# Patient Record
Sex: FEMALE | Employment: UNEMPLOYED | URBAN - METROPOLITAN AREA
[De-identification: names, ages, dates, MRNs, and addresses within clinical notes are randomized per-mention and may not be internally consistent; named-entity substitution may affect disease eponyms.]

---

## 2022-07-25 ENCOUNTER — OFFICE VISIT (OUTPATIENT)
Dept: INTERNAL MEDICINE CLINIC | Facility: CLINIC | Age: 57
End: 2022-07-25
Payer: COMMERCIAL

## 2022-07-25 VITALS
OXYGEN SATURATION: 99 % | BODY MASS INDEX: 33.11 KG/M2 | RESPIRATION RATE: 16 BRPM | DIASTOLIC BLOOD PRESSURE: 70 MMHG | HEART RATE: 91 BPM | HEIGHT: 60 IN | SYSTOLIC BLOOD PRESSURE: 150 MMHG | TEMPERATURE: 99 F | WEIGHT: 168.63 LBS

## 2022-07-25 DIAGNOSIS — E78.5 HYPERLIPIDEMIA, UNSPECIFIED HYPERLIPIDEMIA TYPE: ICD-10-CM

## 2022-07-25 DIAGNOSIS — I10 HYPERTENSION, UNSPECIFIED TYPE: ICD-10-CM

## 2022-07-25 DIAGNOSIS — L30.9 DERMATITIS: ICD-10-CM

## 2022-07-25 DIAGNOSIS — Z12.31 ENCOUNTER FOR SCREENING MAMMOGRAM FOR MALIGNANT NEOPLASM OF BREAST: ICD-10-CM

## 2022-07-25 DIAGNOSIS — Z12.11 COLON CANCER SCREENING: ICD-10-CM

## 2022-07-25 DIAGNOSIS — E11.65 TYPE 2 DIABETES MELLITUS WITH HYPERGLYCEMIA, UNSPECIFIED WHETHER LONG TERM INSULIN USE (HCC): Primary | ICD-10-CM

## 2022-07-25 LAB
CARTRIDGE LOT#: 855 NUMERIC
HEMOGLOBIN A1C: 13.1 % (ref 4.3–5.6)

## 2022-07-25 RX ORDER — LISINOPRIL 20 MG/1
20 TABLET ORAL DAILY
COMMUNITY
End: 2022-07-25

## 2022-07-25 RX ORDER — ATORVASTATIN CALCIUM 40 MG/1
40 TABLET, FILM COATED ORAL NIGHTLY
Qty: 90 TABLET | Refills: 0 | Status: SHIPPED | OUTPATIENT
Start: 2022-07-25

## 2022-07-25 RX ORDER — LISINOPRIL 20 MG/1
20 TABLET ORAL DAILY
Qty: 90 TABLET | Refills: 0 | Status: SHIPPED | OUTPATIENT
Start: 2022-07-25

## 2022-07-25 RX ORDER — AMLODIPINE BESYLATE 5 MG/1
5 TABLET ORAL DAILY
Qty: 90 TABLET | Refills: 0 | Status: SHIPPED | OUTPATIENT
Start: 2022-07-25

## 2022-07-25 RX ORDER — ATORVASTATIN CALCIUM 40 MG/1
40 TABLET, FILM COATED ORAL NIGHTLY
COMMUNITY
End: 2022-07-25

## 2022-07-25 RX ORDER — INSULIN GLARGINE 100 [IU]/ML
100 INJECTION, SOLUTION SUBCUTANEOUS NIGHTLY
COMMUNITY
End: 2022-07-25

## 2022-07-25 RX ORDER — INSULIN GLARGINE 100 [IU]/ML
40 INJECTION, SOLUTION SUBCUTANEOUS NIGHTLY
Qty: 12 EACH | Refills: 0 | Status: SHIPPED | OUTPATIENT
Start: 2022-07-25 | End: 2022-07-25

## 2022-07-25 RX ORDER — INSULIN GLARGINE 100 [IU]/ML
40 INJECTION, SOLUTION SUBCUTANEOUS NIGHTLY
Qty: 12 EACH | Refills: 0 | Status: SHIPPED | OUTPATIENT
Start: 2022-07-25

## 2022-07-25 RX ORDER — TRIAMCINOLONE ACETONIDE 1 MG/G
CREAM TOPICAL 2 TIMES DAILY PRN
Qty: 60 G | Refills: 0 | Status: SHIPPED | OUTPATIENT
Start: 2022-07-25

## 2022-07-25 RX ORDER — AMLODIPINE BESYLATE 5 MG/1
5 TABLET ORAL DAILY
COMMUNITY
End: 2022-07-25

## 2022-07-26 ENCOUNTER — LAB ENCOUNTER (OUTPATIENT)
Dept: LAB | Age: 57
End: 2022-07-26
Attending: INTERNAL MEDICINE
Payer: COMMERCIAL

## 2022-07-26 DIAGNOSIS — E11.65 TYPE 2 DIABETES MELLITUS WITH HYPERGLYCEMIA, UNSPECIFIED WHETHER LONG TERM INSULIN USE (HCC): ICD-10-CM

## 2022-07-26 DIAGNOSIS — I10 HYPERTENSION, UNSPECIFIED TYPE: ICD-10-CM

## 2022-07-26 DIAGNOSIS — E78.5 HYPERLIPIDEMIA, UNSPECIFIED HYPERLIPIDEMIA TYPE: ICD-10-CM

## 2022-07-26 DIAGNOSIS — E11.65 TYPE 2 DIABETES MELLITUS WITH HYPERGLYCEMIA, UNSPECIFIED WHETHER LONG TERM INSULIN USE (HCC): Primary | ICD-10-CM

## 2022-07-26 DIAGNOSIS — L30.9 DERMATITIS: ICD-10-CM

## 2022-07-26 LAB
ALBUMIN SERPL-MCNC: 3.5 G/DL (ref 3.4–5)
ALBUMIN/GLOB SERPL: 0.8 {RATIO} (ref 1–2)
ALP LIVER SERPL-CCNC: 97 U/L
ALT SERPL-CCNC: 24 U/L
ANION GAP SERPL CALC-SCNC: 5 MMOL/L (ref 0–18)
AST SERPL-CCNC: 12 U/L (ref 15–37)
BASOPHILS # BLD AUTO: 0.04 X10(3) UL (ref 0–0.2)
BASOPHILS NFR BLD AUTO: 0.5 %
BILIRUB SERPL-MCNC: 0.3 MG/DL (ref 0.1–2)
BUN BLD-MCNC: 11 MG/DL (ref 7–18)
CALCIUM BLD-MCNC: 9 MG/DL (ref 8.5–10.1)
CHLORIDE SERPL-SCNC: 103 MMOL/L (ref 98–112)
CHOLEST SERPL-MCNC: 214 MG/DL (ref ?–200)
CO2 SERPL-SCNC: 29 MMOL/L (ref 21–32)
CREAT BLD-MCNC: 0.67 MG/DL
EOSINOPHIL # BLD AUTO: 0.23 X10(3) UL (ref 0–0.7)
EOSINOPHIL NFR BLD AUTO: 3 %
ERYTHROCYTE [DISTWIDTH] IN BLOOD BY AUTOMATED COUNT: 13.3 %
FASTING PATIENT LIPID ANSWER: YES
FASTING STATUS PATIENT QL REPORTED: YES
GLOBULIN PLAS-MCNC: 4.3 G/DL (ref 2.8–4.4)
GLUCOSE BLD-MCNC: 200 MG/DL (ref 70–99)
HCT VFR BLD AUTO: 38 %
HDLC SERPL-MCNC: 42 MG/DL (ref 40–59)
HGB BLD-MCNC: 12.3 G/DL
IMM GRANULOCYTES # BLD AUTO: 0.01 X10(3) UL (ref 0–1)
IMM GRANULOCYTES NFR BLD: 0.1 %
LDLC SERPL CALC-MCNC: 124 MG/DL (ref ?–100)
LYMPHOCYTES # BLD AUTO: 4.9 X10(3) UL (ref 1–4)
LYMPHOCYTES NFR BLD AUTO: 63.3 %
MCH RBC QN AUTO: 27.6 PG (ref 26–34)
MCHC RBC AUTO-ENTMCNC: 32.4 G/DL (ref 31–37)
MCV RBC AUTO: 85.2 FL
MONOCYTES # BLD AUTO: 0.49 X10(3) UL (ref 0.1–1)
MONOCYTES NFR BLD AUTO: 6.3 %
NEUTROPHILS # BLD AUTO: 2.07 X10 (3) UL (ref 1.5–7.7)
NEUTROPHILS # BLD AUTO: 2.07 X10(3) UL (ref 1.5–7.7)
NEUTROPHILS NFR BLD AUTO: 26.8 %
NONHDLC SERPL-MCNC: 172 MG/DL (ref ?–130)
OSMOLALITY SERPL CALC.SUM OF ELEC: 289 MOSM/KG (ref 275–295)
PLATELET # BLD AUTO: 295 10(3)UL (ref 150–450)
POTASSIUM SERPL-SCNC: 4 MMOL/L (ref 3.5–5.1)
PROT SERPL-MCNC: 7.8 G/DL (ref 6.4–8.2)
RBC # BLD AUTO: 4.46 X10(6)UL
SODIUM SERPL-SCNC: 137 MMOL/L (ref 136–145)
TRIGL SERPL-MCNC: 275 MG/DL (ref 30–149)
TSI SER-ACNC: 3.12 MIU/ML (ref 0.36–3.74)
VIT D+METAB SERPL-MCNC: 23.3 NG/ML (ref 30–100)
VLDLC SERPL CALC-MCNC: 50 MG/DL (ref 0–30)
WBC # BLD AUTO: 7.7 X10(3) UL (ref 4–11)

## 2022-07-26 PROCEDURE — 82306 VITAMIN D 25 HYDROXY: CPT | Performed by: INTERNAL MEDICINE

## 2022-07-26 PROCEDURE — 80050 GENERAL HEALTH PANEL: CPT | Performed by: INTERNAL MEDICINE

## 2022-07-26 PROCEDURE — 80061 LIPID PANEL: CPT | Performed by: INTERNAL MEDICINE

## 2022-07-26 RX ORDER — INSULIN GLARGINE 100 [IU]/ML
40 INJECTION, SOLUTION SUBCUTANEOUS NIGHTLY
Qty: 12 EACH | Refills: 0 | Status: SHIPPED | OUTPATIENT
Start: 2022-07-26

## 2022-07-26 NOTE — TELEPHONE ENCOUNTER
Received fax from [address] requesting #180 tablets for Metformin for a 90-day supply (directions are bid). Also, Lantus is not covered under patient's insurance. \"Please verify and send new rx for Basaglar. \"

## 2022-07-28 ENCOUNTER — TELEPHONE (OUTPATIENT)
Dept: INTERNAL MEDICINE CLINIC | Facility: CLINIC | Age: 57
End: 2022-07-28

## 2022-07-28 NOTE — TELEPHONE ENCOUNTER
Outgoing fax of medical records to [facility]     Received confirmation report made a copy of the medical authorization form. Original was sent to scan and copy was placed in DV accordion.

## 2022-08-02 DIAGNOSIS — E11.65 TYPE 2 DIABETES MELLITUS WITH HYPERGLYCEMIA, UNSPECIFIED WHETHER LONG TERM INSULIN USE (HCC): ICD-10-CM

## 2022-08-02 NOTE — TELEPHONE ENCOUNTER
Incoming Refill for Insulin Glargine 100     Pending Medication routed to [facility] Medical Assistant basket.

## 2022-08-03 RX ORDER — INSULIN GLARGINE 100 [IU]/ML
40 INJECTION, SOLUTION SUBCUTANEOUS NIGHTLY
Qty: 12 ML | Refills: 2 | Status: SHIPPED | OUTPATIENT
Start: 2022-08-03

## 2022-08-03 NOTE — TELEPHONE ENCOUNTER
LOV: 7/25/2022 with Dr. Micheline Brink  RTC: 4 weeks  Last Relevant Labs: July 2022  Filled: 7/26/2022    #12 each with 0 refills    Future Appointments   Date Time Provider [facility] Jocelyn   8/22/2022  3:00 PM Hellen Hilliard MD [facility] [place]

## 2022-08-15 ENCOUNTER — TELEPHONE (OUTPATIENT)
Dept: INTERNAL MEDICINE CLINIC | Facility: CLINIC | Age: 57
End: 2022-08-15

## 2022-08-22 ENCOUNTER — OFFICE VISIT (OUTPATIENT)
Dept: INTERNAL MEDICINE CLINIC | Facility: CLINIC | Age: 57
End: 2022-08-22
Payer: COMMERCIAL

## 2022-08-22 VITALS
RESPIRATION RATE: 16 BRPM | WEIGHT: 166.19 LBS | BODY MASS INDEX: 32.63 KG/M2 | TEMPERATURE: 98 F | DIASTOLIC BLOOD PRESSURE: 80 MMHG | OXYGEN SATURATION: 99 % | HEIGHT: 60 IN | HEART RATE: 80 BPM | SYSTOLIC BLOOD PRESSURE: 144 MMHG

## 2022-08-22 DIAGNOSIS — E11.65 TYPE 2 DIABETES MELLITUS WITH HYPERGLYCEMIA, UNSPECIFIED WHETHER LONG TERM INSULIN USE (HCC): ICD-10-CM

## 2022-08-22 DIAGNOSIS — Z00.00 ANNUAL PHYSICAL EXAM: Primary | ICD-10-CM

## 2022-08-22 DIAGNOSIS — Z12.4 CERVICAL CANCER SCREENING: ICD-10-CM

## 2022-08-22 DIAGNOSIS — I10 HYPERTENSION, UNSPECIFIED TYPE: ICD-10-CM

## 2022-08-22 DIAGNOSIS — E78.5 HYPERLIPIDEMIA, UNSPECIFIED HYPERLIPIDEMIA TYPE: ICD-10-CM

## 2022-08-22 DIAGNOSIS — Z23 IMMUNIZATION DUE: ICD-10-CM

## 2022-08-22 PROCEDURE — 3079F DIAST BP 80-89 MM HG: CPT | Performed by: INTERNAL MEDICINE

## 2022-08-22 PROCEDURE — 3008F BODY MASS INDEX DOCD: CPT | Performed by: INTERNAL MEDICINE

## 2022-08-22 PROCEDURE — 90750 HZV VACC RECOMBINANT IM: CPT | Performed by: INTERNAL MEDICINE

## 2022-08-22 PROCEDURE — 90677 PCV20 VACCINE IM: CPT | Performed by: INTERNAL MEDICINE

## 2022-08-22 PROCEDURE — 99214 OFFICE O/P EST MOD 30 MIN: CPT | Performed by: INTERNAL MEDICINE

## 2022-08-22 PROCEDURE — 90472 IMMUNIZATION ADMIN EACH ADD: CPT | Performed by: INTERNAL MEDICINE

## 2022-08-22 PROCEDURE — 90471 IMMUNIZATION ADMIN: CPT | Performed by: INTERNAL MEDICINE

## 2022-08-22 PROCEDURE — 99396 PREV VISIT EST AGE 40-64: CPT | Performed by: INTERNAL MEDICINE

## 2022-08-22 PROCEDURE — 3077F SYST BP >= 140 MM HG: CPT | Performed by: INTERNAL MEDICINE

## 2022-08-22 RX ORDER — INSULIN ASPART 100 [IU]/ML
15 INJECTION, SUSPENSION SUBCUTANEOUS 2 TIMES DAILY WITH MEALS
Qty: 9 EACH | Refills: 0 | Status: SHIPPED | OUTPATIENT
Start: 2022-08-22

## 2022-08-22 RX ORDER — INSULIN ASPART 100 [IU]/ML
15 INJECTION, SUSPENSION SUBCUTANEOUS 2 TIMES DAILY
Qty: 9 EACH | Refills: 0 | Status: SHIPPED | OUTPATIENT
Start: 2022-08-22 | End: 2022-08-22

## 2022-08-22 RX ORDER — LISINOPRIL 40 MG/1
40 TABLET ORAL DAILY
Qty: 90 TABLET | Refills: 1 | Status: SHIPPED | OUTPATIENT
Start: 2022-08-22

## 2022-09-06 LAB — HPV I/H RISK 1 DNA SPEC QL NAA+PROBE: NEGATIVE

## 2022-09-07 DIAGNOSIS — B96.89 BV (BACTERIAL VAGINOSIS): Primary | ICD-10-CM

## 2022-09-07 DIAGNOSIS — N76.0 BV (BACTERIAL VAGINOSIS): Primary | ICD-10-CM

## 2022-09-07 RX ORDER — METRONIDAZOLE 500 MG/1
500 TABLET ORAL 2 TIMES DAILY
Qty: 14 TABLET | Refills: 0 | Status: SHIPPED | OUTPATIENT
Start: 2022-09-07 | End: 2022-09-14

## 2022-09-15 ENCOUNTER — TELEPHONE (OUTPATIENT)
Dept: INTERNAL MEDICINE CLINIC | Facility: CLINIC | Age: 57
End: 2022-09-15

## 2022-09-15 DIAGNOSIS — B96.89 BACTERIAL VAGINITIS: Primary | ICD-10-CM

## 2022-09-15 DIAGNOSIS — N76.0 BACTERIAL VAGINITIS: Primary | ICD-10-CM

## 2022-09-15 RX ORDER — METRONIDAZOLE 500 MG/1
500 TABLET ORAL 2 TIMES DAILY
Qty: 14 TABLET | Refills: 0 | Status: SHIPPED | OUTPATIENT
Start: 2022-09-15 | End: 2022-09-22

## 2022-09-15 NOTE — TELEPHONE ENCOUNTER
DV: CASSANDRA previous metronidazole prescription . Have been unable to reach patient to inform of positive BV noted on last pap smear. Left 2nd message and sent [facility] alerting patient on importance of contacting office today. Repeat Metronidazole rx pended in case you would like to reorder prior to us talking with patient.

## 2022-09-15 NOTE — TELEPHONE ENCOUNTER
----- Message from Vidhya Henson MD sent at 9/7/2022  9:56 AM CDT -----  Results reviewed. Please inform patient that pap smear is negative for malignancy and HPV. However, incidentally found to have BV. Should be treated with metronidazole 500mg twice a day for 7 days; a prescription was sent. Should complete the course and abstain from intercourse until treatment is completed. It is also recommended to avoid alcohol consumption while taking this medication.

## 2022-09-23 DIAGNOSIS — B96.89 BACTERIAL VAGINITIS: Primary | ICD-10-CM

## 2022-09-23 DIAGNOSIS — N76.0 BACTERIAL VAGINITIS: Primary | ICD-10-CM

## 2022-09-23 RX ORDER — METRONIDAZOLE 500 MG/1
500 TABLET ORAL 2 TIMES DAILY
Qty: 14 TABLET | Refills: 0 | Status: SHIPPED | OUTPATIENT
Start: 2022-09-23 | End: 2022-09-30

## 2022-09-23 NOTE — TELEPHONE ENCOUNTER
DV, finally reached daughter. Prescription  a 2nd time. Pended for you again, daughter will pick it up today. ty    [phone] [place]     [phone] spoke to daughter Curt Smith (on verbal) speaks English, gave results and recommendations. Confirmed preferred pharmacy. Daughter will inform the patient and  the prescription for her today after work.

## 2022-09-23 NOTE — TELEPHONE ENCOUNTER
----- Message from Vikki Edgar MD sent at 9/7/2022  9:56 AM CDT -----  Results reviewed. Please inform patient that pap smear is negative for malignancy and HPV. However, incidentally found to have BV. Should be treated with metronidazole 500mg twice a day for 7 days; a prescription was sent. Should complete the course and abstain from intercourse until treatment is completed. It is also recommended to avoid alcohol consumption while taking this medication.

## 2022-10-27 DIAGNOSIS — I10 HYPERTENSION, UNSPECIFIED TYPE: ICD-10-CM

## 2022-10-27 DIAGNOSIS — E78.5 HYPERLIPIDEMIA, UNSPECIFIED HYPERLIPIDEMIA TYPE: ICD-10-CM

## 2022-10-27 DIAGNOSIS — E11.65 TYPE 2 DIABETES MELLITUS WITH HYPERGLYCEMIA, UNSPECIFIED WHETHER LONG TERM INSULIN USE (HCC): ICD-10-CM

## 2022-10-27 RX ORDER — DAPAGLIFLOZIN 10 MG/1
TABLET, FILM COATED ORAL
Qty: 90 TABLET | Refills: 0 | Status: SHIPPED | OUTPATIENT
Start: 2022-10-27

## 2022-10-27 RX ORDER — ATORVASTATIN CALCIUM 40 MG/1
TABLET, FILM COATED ORAL
Qty: 90 TABLET | Refills: 0 | Status: SHIPPED | OUTPATIENT
Start: 2022-10-27

## 2022-10-27 RX ORDER — AMLODIPINE BESYLATE 5 MG/1
TABLET ORAL
Qty: 90 TABLET | Refills: 0 | Status: SHIPPED | OUTPATIENT
Start: 2022-10-27

## 2022-11-13 DIAGNOSIS — E11.65 TYPE 2 DIABETES MELLITUS WITH HYPERGLYCEMIA, UNSPECIFIED WHETHER LONG TERM INSULIN USE (HCC): ICD-10-CM

## 2022-11-19 RX ORDER — INSULIN ASPART 100 [IU]/ML
15 INJECTION, SUSPENSION SUBCUTANEOUS 2 TIMES DAILY WITH MEALS
Qty: 4 EACH | Refills: 0 | Status: SHIPPED | OUTPATIENT
Start: 2022-11-19

## 2022-11-20 NOTE — TELEPHONE ENCOUNTER
Please remind pt to make appt asap. Over due for a1c/DM follow-up. Will need appt for further refills.

## 2022-12-05 ENCOUNTER — OFFICE VISIT (OUTPATIENT)
Dept: INTERNAL MEDICINE CLINIC | Facility: CLINIC | Age: 57
End: 2022-12-05
Payer: COMMERCIAL

## 2022-12-05 VITALS
BODY MASS INDEX: 33 KG/M2 | RESPIRATION RATE: 16 BRPM | WEIGHT: 167 LBS | HEART RATE: 80 BPM | OXYGEN SATURATION: 98 % | SYSTOLIC BLOOD PRESSURE: 138 MMHG | DIASTOLIC BLOOD PRESSURE: 76 MMHG

## 2022-12-05 DIAGNOSIS — Z23 IMMUNIZATION DUE: ICD-10-CM

## 2022-12-05 DIAGNOSIS — E11.65 TYPE 2 DIABETES MELLITUS WITH HYPERGLYCEMIA, UNSPECIFIED WHETHER LONG TERM INSULIN USE (HCC): Primary | ICD-10-CM

## 2022-12-05 DIAGNOSIS — E78.5 HYPERLIPIDEMIA, UNSPECIFIED HYPERLIPIDEMIA TYPE: ICD-10-CM

## 2022-12-05 DIAGNOSIS — D72.820 ELEVATED LYMPHOCYTE COUNT: ICD-10-CM

## 2022-12-05 DIAGNOSIS — I10 HYPERTENSION, UNSPECIFIED TYPE: ICD-10-CM

## 2022-12-05 LAB
CARTRIDGE LOT#: ABNORMAL NUMERIC
HEMOGLOBIN A1C: 10.2 % (ref 4.3–5.6)

## 2022-12-05 RX ORDER — INSULIN ASPART 100 [IU]/ML
22 INJECTION, SUSPENSION SUBCUTANEOUS 2 TIMES DAILY WITH MEALS
Qty: 15 EACH | Refills: 0 | Status: SHIPPED | OUTPATIENT
Start: 2022-12-05

## 2022-12-05 RX ORDER — AMLODIPINE BESYLATE 5 MG/1
5 TABLET ORAL DAILY
Qty: 90 TABLET | Refills: 1 | Status: SHIPPED | OUTPATIENT
Start: 2022-12-05

## 2022-12-05 RX ORDER — LISINOPRIL 40 MG/1
40 TABLET ORAL DAILY
Qty: 90 TABLET | Refills: 3 | Status: SHIPPED | OUTPATIENT
Start: 2022-12-05

## 2022-12-05 RX ORDER — ATORVASTATIN CALCIUM 40 MG/1
40 TABLET, FILM COATED ORAL NIGHTLY
Qty: 90 TABLET | Refills: 3 | Status: SHIPPED | OUTPATIENT
Start: 2022-12-05

## 2023-01-25 ENCOUNTER — HOSPITAL ENCOUNTER (OUTPATIENT)
Age: 58
Discharge: HOME | End: 2023-01-25
Payer: COMMERCIAL

## 2023-01-25 VITALS
OXYGEN SATURATION: 97 % | SYSTOLIC BLOOD PRESSURE: 160 MMHG | HEART RATE: 91 BPM | DIASTOLIC BLOOD PRESSURE: 62 MMHG | WEIGHT: 176 LBS | TEMPERATURE: 98 F | BODY MASS INDEX: 32.39 KG/M2 | HEIGHT: 62 IN | RESPIRATION RATE: 18 BRPM

## 2023-01-25 DIAGNOSIS — R59.1 LYMPHADENOPATHY: Primary | ICD-10-CM

## 2023-01-25 DIAGNOSIS — E11.65 TYPE 2 DIABETES MELLITUS WITH HYPERGLYCEMIA, UNSPECIFIED WHETHER LONG TERM INSULIN USE (HCC): ICD-10-CM

## 2023-01-25 PROCEDURE — 99203 OFFICE O/P NEW LOW 30 MIN: CPT

## 2023-01-25 PROCEDURE — 99213 OFFICE O/P EST LOW 20 MIN: CPT

## 2023-01-25 RX ORDER — AMOXICILLIN AND CLAVULANATE POTASSIUM 875; 125 MG/1; MG/1
1 TABLET, FILM COATED ORAL 2 TIMES DAILY
Qty: 20 TABLET | Refills: 0 | Status: SHIPPED | OUTPATIENT
Start: 2023-01-25 | End: 2023-02-04

## 2023-01-25 NOTE — ED INITIAL ASSESSMENT (HPI)
Pt states for last week has had rt sided ear pain and radiated to rt side of head. Rt facial pain. States constant pain that starts in rt ear and radiates to head. Denies n/v. Denies dizziness.

## 2023-01-25 NOTE — DISCHARGE INSTRUCTIONS
Warm moist compresses. Take Augmentin as written. Alternate Tylenol and Motrin. Primary care for recheck in 5 to 7 days.

## 2023-01-26 RX ORDER — INSULIN ASPART 100 [IU]/ML
22 INJECTION, SUSPENSION SUBCUTANEOUS 2 TIMES DAILY WITH MEALS
Qty: 54 ML | Refills: 0 | Status: SHIPPED | OUTPATIENT
Start: 2023-01-26

## 2023-01-26 NOTE — TELEPHONE ENCOUNTER
Patient scheduled for 02/14/23 at 4:40 pm   She is aware to bring [facility] readings

## 2023-02-27 ENCOUNTER — LAB ENCOUNTER (OUTPATIENT)
Dept: LAB | Age: 58
End: 2023-02-27
Attending: INTERNAL MEDICINE
Payer: COMMERCIAL

## 2023-02-27 DIAGNOSIS — D72.820 ELEVATED LYMPHOCYTE COUNT: ICD-10-CM

## 2023-02-27 DIAGNOSIS — E78.5 HYPERLIPIDEMIA, UNSPECIFIED HYPERLIPIDEMIA TYPE: ICD-10-CM

## 2023-02-27 LAB
BASOPHILS # BLD AUTO: 0.05 X10(3) UL (ref 0–0.2)
BASOPHILS NFR BLD AUTO: 0.5 %
CHOLEST SERPL-MCNC: 154 MG/DL (ref ?–200)
EOSINOPHIL # BLD AUTO: 0.29 X10(3) UL (ref 0–0.7)
EOSINOPHIL NFR BLD AUTO: 3 %
ERYTHROCYTE [DISTWIDTH] IN BLOOD BY AUTOMATED COUNT: 13.4 %
FASTING PATIENT LIPID ANSWER: YES
HCT VFR BLD AUTO: 40.5 %
HDLC SERPL-MCNC: 44 MG/DL (ref 40–59)
HGB BLD-MCNC: 12.8 G/DL
IMM GRANULOCYTES # BLD AUTO: 0.02 X10(3) UL (ref 0–1)
IMM GRANULOCYTES NFR BLD: 0.2 %
LDLC SERPL CALC-MCNC: 75 MG/DL (ref ?–100)
LYMPHOCYTES # BLD AUTO: 6.01 X10(3) UL (ref 1–4)
LYMPHOCYTES NFR BLD AUTO: 61.8 %
MCH RBC QN AUTO: 26.9 PG (ref 26–34)
MCHC RBC AUTO-ENTMCNC: 31.6 G/DL (ref 31–37)
MCV RBC AUTO: 85.1 FL
MONOCYTES # BLD AUTO: 0.5 X10(3) UL (ref 0.1–1)
MONOCYTES NFR BLD AUTO: 5.1 %
NEUTROPHILS # BLD AUTO: 2.85 X10 (3) UL (ref 1.5–7.7)
NEUTROPHILS # BLD AUTO: 2.85 X10(3) UL (ref 1.5–7.7)
NEUTROPHILS NFR BLD AUTO: 29.4 %
NONHDLC SERPL-MCNC: 110 MG/DL (ref ?–130)
PLATELET # BLD AUTO: 307 10(3)UL (ref 150–450)
RBC # BLD AUTO: 4.76 X10(6)UL
TRIGL SERPL-MCNC: 212 MG/DL (ref 30–149)
VLDLC SERPL CALC-MCNC: 33 MG/DL (ref 0–30)
WBC # BLD AUTO: 9.7 X10(3) UL (ref 4–11)

## 2023-02-27 PROCEDURE — 85025 COMPLETE CBC W/AUTO DIFF WBC: CPT | Performed by: INTERNAL MEDICINE

## 2023-02-27 PROCEDURE — 80061 LIPID PANEL: CPT | Performed by: INTERNAL MEDICINE

## 2023-03-02 ENCOUNTER — OFFICE VISIT (OUTPATIENT)
Dept: INTERNAL MEDICINE CLINIC | Facility: CLINIC | Age: 58
End: 2023-03-02
Payer: COMMERCIAL

## 2023-03-02 VITALS
SYSTOLIC BLOOD PRESSURE: 130 MMHG | TEMPERATURE: 98 F | WEIGHT: 170.81 LBS | DIASTOLIC BLOOD PRESSURE: 76 MMHG | OXYGEN SATURATION: 100 % | BODY MASS INDEX: 31 KG/M2 | HEART RATE: 72 BPM | RESPIRATION RATE: 15 BRPM

## 2023-03-02 DIAGNOSIS — R07.89 CHEST DISCOMFORT: Primary | ICD-10-CM

## 2023-03-02 DIAGNOSIS — E11.65 TYPE 2 DIABETES MELLITUS WITH HYPERGLYCEMIA, UNSPECIFIED WHETHER LONG TERM INSULIN USE (HCC): ICD-10-CM

## 2023-03-02 DIAGNOSIS — N64.4 BREAST PAIN: ICD-10-CM

## 2023-03-02 DIAGNOSIS — I10 HYPERTENSION, UNSPECIFIED TYPE: ICD-10-CM

## 2023-03-02 DIAGNOSIS — E78.5 HYPERLIPIDEMIA, UNSPECIFIED HYPERLIPIDEMIA TYPE: ICD-10-CM

## 2023-03-02 DIAGNOSIS — G62.9 NEUROPATHY: ICD-10-CM

## 2023-03-02 DIAGNOSIS — D72.820 LYMPHOCYTOSIS: ICD-10-CM

## 2023-03-02 LAB
ATRIAL RATE: 72 BPM
P AXIS: 26 DEGREES
P-R INTERVAL: 172 MS
Q-T INTERVAL: 390 MS
QRS DURATION: 90 MS
QTC CALCULATION (BEZET): 427 MS
R AXIS: 24 DEGREES
T AXIS: 25 DEGREES
VENTRICULAR RATE: 72 BPM

## 2023-03-02 PROCEDURE — 93000 ELECTROCARDIOGRAM COMPLETE: CPT | Performed by: INTERNAL MEDICINE

## 2023-03-02 PROCEDURE — 3075F SYST BP GE 130 - 139MM HG: CPT | Performed by: INTERNAL MEDICINE

## 2023-03-02 PROCEDURE — 99214 OFFICE O/P EST MOD 30 MIN: CPT | Performed by: INTERNAL MEDICINE

## 2023-03-02 PROCEDURE — 3078F DIAST BP <80 MM HG: CPT | Performed by: INTERNAL MEDICINE

## 2023-03-02 RX ORDER — GABAPENTIN 100 MG/1
100 CAPSULE ORAL AS DIRECTED
Qty: 90 CAPSULE | Refills: 0 | Status: SHIPPED | OUTPATIENT
Start: 2023-03-02

## 2023-03-02 RX ORDER — VALACYCLOVIR HYDROCHLORIDE 1 G/1
1000 TABLET, FILM COATED ORAL EVERY 8 HOURS
Qty: 21 TABLET | Refills: 0 | Status: SHIPPED | OUTPATIENT
Start: 2023-03-02 | End: 2023-03-09

## 2023-08-10 ENCOUNTER — OFFICE VISIT (OUTPATIENT)
Dept: INTERNAL MEDICINE CLINIC | Facility: CLINIC | Age: 58
End: 2023-08-10
Payer: COMMERCIAL

## 2023-08-10 VITALS
WEIGHT: 173.81 LBS | DIASTOLIC BLOOD PRESSURE: 60 MMHG | TEMPERATURE: 98 F | RESPIRATION RATE: 16 BRPM | HEART RATE: 91 BPM | BODY MASS INDEX: 32 KG/M2 | OXYGEN SATURATION: 98 % | SYSTOLIC BLOOD PRESSURE: 118 MMHG

## 2023-08-10 DIAGNOSIS — E78.5 HYPERLIPIDEMIA, UNSPECIFIED HYPERLIPIDEMIA TYPE: ICD-10-CM

## 2023-08-10 DIAGNOSIS — Z12.11 COLON CANCER SCREENING: ICD-10-CM

## 2023-08-10 DIAGNOSIS — G62.9 NEUROPATHY: ICD-10-CM

## 2023-08-10 DIAGNOSIS — B35.1 ONYCHOMYCOSIS: ICD-10-CM

## 2023-08-10 DIAGNOSIS — R21 RASH: ICD-10-CM

## 2023-08-10 DIAGNOSIS — E11.65 TYPE 2 DIABETES MELLITUS WITH HYPERGLYCEMIA, UNSPECIFIED WHETHER LONG TERM INSULIN USE (HCC): Primary | ICD-10-CM

## 2023-08-10 DIAGNOSIS — I10 HYPERTENSION, UNSPECIFIED TYPE: ICD-10-CM

## 2023-08-10 DIAGNOSIS — M25.642 DECREASED RANGE OF MOTION OF FINGER OF LEFT HAND: ICD-10-CM

## 2023-08-10 DIAGNOSIS — M79.645 PAIN OF LEFT THUMB: ICD-10-CM

## 2023-08-10 LAB
CARTRIDGE LOT#: 603 NUMERIC
HEMOGLOBIN A1C: 8.7 % (ref 4.3–5.6)

## 2023-08-10 PROCEDURE — 3074F SYST BP LT 130 MM HG: CPT | Performed by: INTERNAL MEDICINE

## 2023-08-10 PROCEDURE — 3052F HG A1C>EQUAL 8.0%<EQUAL 9.0%: CPT | Performed by: INTERNAL MEDICINE

## 2023-08-10 PROCEDURE — 99214 OFFICE O/P EST MOD 30 MIN: CPT | Performed by: INTERNAL MEDICINE

## 2023-08-10 PROCEDURE — 83036 HEMOGLOBIN GLYCOSYLATED A1C: CPT | Performed by: INTERNAL MEDICINE

## 2023-08-10 PROCEDURE — 3078F DIAST BP <80 MM HG: CPT | Performed by: INTERNAL MEDICINE

## 2023-08-10 RX ORDER — INSULIN ASPART 100 [IU]/ML
24 INJECTION, SUSPENSION SUBCUTANEOUS 2 TIMES DAILY WITH MEALS
Qty: 45 ML | Refills: 0 | Status: SHIPPED | OUTPATIENT
Start: 2023-08-10

## 2023-08-10 RX ORDER — CLOTRIMAZOLE AND BETAMETHASONE DIPROPIONATE 10; .64 MG/G; MG/G
1 CREAM TOPICAL 2 TIMES DAILY PRN
Qty: 60 G | Refills: 0 | Status: SHIPPED | OUTPATIENT
Start: 2023-08-10 | End: 2023-08-24

## 2023-08-10 RX ORDER — GABAPENTIN 100 MG/1
100 CAPSULE ORAL AS DIRECTED
Qty: 90 CAPSULE | Refills: 0 | Status: SHIPPED | OUTPATIENT
Start: 2023-08-10

## 2023-08-11 RX ORDER — AMLODIPINE BESYLATE 5 MG/1
5 TABLET ORAL DAILY
Qty: 90 TABLET | Refills: 3 | Status: SHIPPED | OUTPATIENT
Start: 2023-08-11

## 2023-08-11 NOTE — TELEPHONE ENCOUNTER
AMLODIPINE BESYLATE 5 MG TAB          Will file in chart as: AMLODIPINE 5 MG Oral Tab    Sig: Take 1 tablet (5 mg total) by mouth daily. Disp: 90 tablet    Refills: 1    Start: 8/10/2023    Class: Normal    Non-formulary For: Hypertension, unspecified type    Last ordered: 8 months ago by Narciso Nunes MD Last refill: 6/3/2023    Rx #: [id number]    Hypertension Medications Protocol Iyfwzn33/10/2023 03:45 PM   Protocol Details CMP or BMP in past 12 months    Last serum creatinine< 2.0    Appointment in past 6 or next 3 months      LOV yesterday   RTC 4 weeks  Filled 12/5/22 90 tabs 1 refill  Last CMP 7/26/22  No future appointments.

## 2023-11-06 ENCOUNTER — HOSPITAL ENCOUNTER (OUTPATIENT)
Dept: GENERAL RADIOLOGY | Age: 58
Discharge: HOME | End: 2023-11-06
Attending: INTERNAL MEDICINE
Payer: COMMERCIAL

## 2023-11-06 DIAGNOSIS — M79.645 PAIN OF LEFT THUMB: ICD-10-CM

## 2023-11-06 DIAGNOSIS — M25.642 DECREASED RANGE OF MOTION OF FINGER OF LEFT HAND: ICD-10-CM

## 2023-11-06 PROCEDURE — 73140 X-RAY EXAM OF FINGER(S): CPT | Performed by: INTERNAL MEDICINE

## 2023-11-07 DIAGNOSIS — M25.60 RANGE JOINT MOVEMENT REDUCED: Primary | ICD-10-CM

## 2023-11-07 DIAGNOSIS — M79.642 PAIN OF LEFT HAND: ICD-10-CM

## 2023-12-05 DIAGNOSIS — E11.65 TYPE 2 DIABETES MELLITUS WITH HYPERGLYCEMIA, UNSPECIFIED WHETHER LONG TERM INSULIN USE (HCC): ICD-10-CM

## 2023-12-06 RX ORDER — INSULIN ASPART 100 [IU]/ML
24 INJECTION, SUSPENSION SUBCUTANEOUS 2 TIMES DAILY WITH MEALS
Qty: 15 ML | Refills: 0 | Status: SHIPPED | OUTPATIENT
Start: 2023-12-06 | End: 2024-01-05

## 2023-12-06 NOTE — TELEPHONE ENCOUNTER
NO PROTOCOL    Name from pharmacy: NOVOLOG MIX 70-30 FLEXPEN         Will file in chart as: NovoLOG Mix 70/30 FlexPen 100 UNIT/ML Susp Pen-injector (Insulin Aspart Prot & Aspart 70/30)    Sig: Inject 24 Units into the skin 2 (two) times daily with meals.    Disp: Not specified (Pharmacy requested: 45 Undefined)    Refills: 0 (Pharmacy requested: Not specified)    Start: 12/5/2023    Class: Normal    Non-formulary For: Type 2 diabetes mellitus with hyperglycemia, unspecified whether long term insulin use (HCC)    Last ordered: 3 months ago (8/10/2023) by Abi Lyman MD    Last refill: 8/10/2023    Rx #: [id number]       To be filled at: [facility] [id number] [place] [address] [phone], [phone]          LOV: 08/10/23 w/ DV   RTC: 09/07/23   RECENT LABS: 02/2027  FOV: NO FOV

## 2024-01-04 DIAGNOSIS — G62.9 NEUROPATHY: ICD-10-CM

## 2024-01-04 DIAGNOSIS — E11.65 TYPE 2 DIABETES MELLITUS WITH HYPERGLYCEMIA, UNSPECIFIED WHETHER LONG TERM INSULIN USE (HCC): ICD-10-CM

## 2024-01-04 DIAGNOSIS — R21 RASH: ICD-10-CM

## 2024-01-04 NOTE — TELEPHONE ENCOUNTER
NO PROTOCOL    Name from pharmacy: NOVOLOG MIX 70-30 FLEXPEN         Will file in chart as: NovoLOG Mix 70/30 FlexPen 100 UNIT/ML Susp Pen-injector (Insulin Aspart Prot & Aspart 70/30)    Sig: Inject 24 Units into the skin 2 (two) times daily with meals. Due for follow-up    Disp: Not specified (Pharmacy requested: 15 Undefined)    Refills: 0 (Pharmacy requested: Not specified)    Start: 1/4/2024    Class: Normal    Non-formulary For: Type 2 diabetes mellitus with hyperglycemia, unspecified whether long term insulin use (HCC)    Last ordered: 4 weeks ago (12/6/2023) by Abi Jose MD    Last refill: 12/7/2023    Rx #: [id number]        Name from pharmacy: GABAPENTIN 100 MG CAPSULE         Will file in chart as: GABAPENTIN 100 MG Oral Cap    Sig: TAKE 1 CAPSULE BY MOUTH TWICE A DAY FOR 3 DAYS, THEN INCREASE TO 1 CAPSULE THREE TIMES A DAY (EVERY 8 HOURS)    Disp: 90 capsule    Refills: 0 (Pharmacy requested: Not specified)    Start: 1/4/2024    Class: Normal    Non-formulary For: Neuropathy    Last ordered: 4 months ago (8/10/2023) by Abi Jose MD    Last refill: 8/10/2023    Rx #: [id number]        Name from pharmacy: CLOTRIMAZOLE-BETAMETHASONE CRM         Will file in chart as: CLOTRIMAZOLE-BETAMETHASONE 1-0.05 % External Cream    Sig: APPLY 1 APPLICATION TOPICALLY TWICE A DAY AS NEEDED    Disp: 60 g    Refills: 0 (Pharmacy requested: Not specified)    Start: 1/4/2024    Class: Normal    Non-formulary For: Rash    Last ordered: 4 months ago (8/10/2023) by Abi Jose MD    Last refill: 8/10/2023    Rx #: [id number]       To be filled at: [place] [address] [place] [address] [phone], [phone]     LOV:  RTC:  RECENT LABS:  FOV:

## 2024-01-05 RX ORDER — INSULIN ASPART 100 [IU]/ML
24 INJECTION, SUSPENSION SUBCUTANEOUS 2 TIMES DAILY WITH MEALS
Qty: 15 ML | Refills: 0 | Status: SHIPPED | OUTPATIENT
Start: 2024-01-05 | End: 2024-01-11

## 2024-01-05 RX ORDER — CLOTRIMAZOLE AND BETAMETHASONE DIPROPIONATE 10; .64 MG/G; MG/G
1 CREAM TOPICAL 2 TIMES DAILY PRN
Qty: 60 G | Refills: 0 | Status: SHIPPED | OUTPATIENT
Start: 2024-01-05

## 2024-01-05 RX ORDER — GABAPENTIN 100 MG/1
CAPSULE ORAL
Qty: 90 CAPSULE | Refills: 0 | Status: SHIPPED | OUTPATIENT
Start: 2024-01-05

## 2024-01-05 NOTE — TELEPHONE ENCOUNTER
Gabapentin 100 mg  Filled 8-10-23  Qty 90  0 refills  No future appointments.  LOV 8-10-23 DV        Novolog  Filled 12-6-23  Qty 15 mL  0 refills  No future appointments.  LOV 8-10-23 DV  Labs 8-10-23 A1c    Clotrimazole cream  Filled 8-10-23  Qty 60g  0 refills  No future appointments.  LOV 8-10-23 DV   I have reviewed and confirmed nurses' notes for patient's medications, allergies, medical history, and surgical history.

## 2024-01-11 RX ORDER — INSULIN LISPRO 100 [IU]/ML
24 INJECTION, SUSPENSION SUBCUTANEOUS 2 TIMES DAILY WITH MEALS
Qty: 3 ML | Refills: 0 | Status: SHIPPED | OUTPATIENT
Start: 2024-01-11 | End: 2024-02-06

## 2024-01-11 RX ORDER — INSULIN LISPRO 100 [IU]/ML
INJECTION, SOLUTION INTRAVENOUS; SUBCUTANEOUS
Refills: 0 | Status: CANCELLED | OUTPATIENT
Start: 2024-01-11

## 2024-01-11 NOTE — TELEPHONE ENCOUNTER
New order for Humalog pen signed.   Please make sure novolog discontinued and pt notified.   Also due for follow-up

## 2024-01-11 NOTE — TELEPHONE ENCOUNTER
Incoming Fax  We received a Alternative Response for Novolog  insurance won't cover Novolog but will cover Humalog awaiting for Dr. Lyman to approve. Pended Humalog

## 2024-01-26 DIAGNOSIS — E11.65 TYPE 2 DIABETES MELLITUS WITH HYPERGLYCEMIA, UNSPECIFIED WHETHER LONG TERM INSULIN USE (HCC): ICD-10-CM

## 2024-01-26 NOTE — TELEPHONE ENCOUNTER
Name from pharmacy: METFORMIN HCL 1,000 MG TABLET         Will file in chart as: METFORMIN HCL 1000 MG Oral Tab    Sig: TAKE 1 TABLET BY MOUTH TWICE A DAY WITH MEALS    Disp: 180 tablet    Refills: 1    Start: 1/26/2024    Class: Normal    Non-formulary For: Type 2 diabetes mellitus with hyperglycemia, unspecified whether long term insulin use (HCC)    Last ordered: 5 months ago (8/10/2023) by Abi Jose MD    Last refill: 12/31/2023    Rx #: [id number]    Diabetic Medication Protocol [id number]/26/2024 12:32 PM   Protocol Details Last HgBA1C < 7.5    HgBA1C procedure resulted in past 6 months    Microalbumin procedure in past 12 months or taking ACE/ARB    Appointment in past 6 or next 3 months      To be filled at: [facility] [address] [place] [address] [phone], [phone]

## 2024-02-01 ENCOUNTER — LAB ENCOUNTER (OUTPATIENT)
Dept: LAB | Age: 59
End: 2024-02-01
Attending: INTERNAL MEDICINE
Payer: COMMERCIAL

## 2024-02-01 DIAGNOSIS — E11.65 TYPE 2 DIABETES MELLITUS WITH HYPERGLYCEMIA, UNSPECIFIED WHETHER LONG TERM INSULIN USE (HCC): ICD-10-CM

## 2024-02-01 DIAGNOSIS — I10 HYPERTENSION, UNSPECIFIED TYPE: ICD-10-CM

## 2024-02-01 DIAGNOSIS — E78.5 HYPERLIPIDEMIA, UNSPECIFIED HYPERLIPIDEMIA TYPE: ICD-10-CM

## 2024-02-01 LAB
ALBUMIN SERPL-MCNC: 3.8 G/DL (ref 3.4–5)
ALBUMIN/GLOB SERPL: 0.8 {RATIO} (ref 1–2)
ALP LIVER SERPL-CCNC: 102 U/L
ALT SERPL-CCNC: 23 U/L
ANION GAP SERPL CALC-SCNC: 6 MMOL/L (ref 0–18)
AST SERPL-CCNC: 19 U/L (ref 15–37)
BASOPHILS # BLD AUTO: 0.05 X10(3) UL (ref 0–0.2)
BASOPHILS NFR BLD AUTO: 0.6 %
BILIRUB SERPL-MCNC: 0.3 MG/DL (ref 0.1–2)
BUN BLD-MCNC: 10 MG/DL (ref 9–23)
CALCIUM BLD-MCNC: 9.5 MG/DL (ref 8.5–10.1)
CHLORIDE SERPL-SCNC: 107 MMOL/L (ref 98–112)
CHOLEST SERPL-MCNC: 204 MG/DL (ref ?–200)
CO2 SERPL-SCNC: 27 MMOL/L (ref 21–32)
CREAT BLD-MCNC: 0.65 MG/DL
CREAT UR-SCNC: 68.5 MG/DL
EGFRCR SERPLBLD CKD-EPI 2021: 103 ML/MIN/1.73M2 (ref 60–?)
EOSINOPHIL # BLD AUTO: 0.35 X10(3) UL (ref 0–0.7)
EOSINOPHIL NFR BLD AUTO: 3.9 %
ERYTHROCYTE [DISTWIDTH] IN BLOOD BY AUTOMATED COUNT: 14 %
EST. AVERAGE GLUCOSE BLD GHB EST-MCNC: 235 MG/DL (ref 68–126)
FASTING PATIENT LIPID ANSWER: YES
FASTING STATUS PATIENT QL REPORTED: YES
GLOBULIN PLAS-MCNC: 4.5 G/DL (ref 2.8–4.4)
GLUCOSE BLD-MCNC: 156 MG/DL (ref 70–99)
HBA1C MFR BLD: 9.8 % (ref ?–5.7)
HCT VFR BLD AUTO: 42 %
HDLC SERPL-MCNC: 45 MG/DL (ref 40–59)
HGB BLD-MCNC: 13.4 G/DL
IMM GRANULOCYTES # BLD AUTO: 0.01 X10(3) UL (ref 0–1)
IMM GRANULOCYTES NFR BLD: 0.1 %
LDLC SERPL CALC-MCNC: 123 MG/DL (ref ?–100)
LYMPHOCYTES # BLD AUTO: 5.11 X10(3) UL (ref 1–4)
LYMPHOCYTES NFR BLD AUTO: 56.9 %
MCH RBC QN AUTO: 26.1 PG (ref 26–34)
MCHC RBC AUTO-ENTMCNC: 31.9 G/DL (ref 31–37)
MCV RBC AUTO: 81.7 FL
MICROALBUMIN UR-MCNC: 4.82 MG/DL
MICROALBUMIN/CREAT 24H UR-RTO: 70.4 UG/MG (ref ?–30)
MONOCYTES # BLD AUTO: 0.57 X10(3) UL (ref 0.1–1)
MONOCYTES NFR BLD AUTO: 6.3 %
NEUTROPHILS # BLD AUTO: 2.89 X10 (3) UL (ref 1.5–7.7)
NEUTROPHILS # BLD AUTO: 2.89 X10(3) UL (ref 1.5–7.7)
NEUTROPHILS NFR BLD AUTO: 32.2 %
NONHDLC SERPL-MCNC: 159 MG/DL (ref ?–130)
OSMOLALITY SERPL CALC.SUM OF ELEC: 292 MOSM/KG (ref 275–295)
PLATELET # BLD AUTO: 334 10(3)UL (ref 150–450)
PLATELET MORPHOLOGY: NORMAL
POTASSIUM SERPL-SCNC: 4.3 MMOL/L (ref 3.5–5.1)
PROT SERPL-MCNC: 8.3 G/DL (ref 6.4–8.2)
RBC # BLD AUTO: 5.14 X10(6)UL
SODIUM SERPL-SCNC: 140 MMOL/L (ref 136–145)
TRIGL SERPL-MCNC: 206 MG/DL (ref 30–149)
TSI SER-ACNC: 2.39 MIU/ML (ref 0.36–3.74)
VIT D+METAB SERPL-MCNC: 15.4 NG/ML (ref 30–100)
VLDLC SERPL CALC-MCNC: 37 MG/DL (ref 0–30)
WBC # BLD AUTO: 9 X10(3) UL (ref 4–11)

## 2024-02-01 PROCEDURE — 80053 COMPREHEN METABOLIC PANEL: CPT

## 2024-02-01 PROCEDURE — 84443 ASSAY THYROID STIM HORMONE: CPT

## 2024-02-01 PROCEDURE — 3061F NEG MICROALBUMINURIA REV: CPT | Performed by: INTERNAL MEDICINE

## 2024-02-01 PROCEDURE — 82570 ASSAY OF URINE CREATININE: CPT

## 2024-02-01 PROCEDURE — 82043 UR ALBUMIN QUANTITATIVE: CPT

## 2024-02-01 PROCEDURE — 3046F HEMOGLOBIN A1C LEVEL >9.0%: CPT | Performed by: INTERNAL MEDICINE

## 2024-02-01 PROCEDURE — 80061 LIPID PANEL: CPT

## 2024-02-01 PROCEDURE — 82306 VITAMIN D 25 HYDROXY: CPT

## 2024-02-01 PROCEDURE — 85025 COMPLETE CBC W/AUTO DIFF WBC: CPT

## 2024-02-01 PROCEDURE — 36415 COLL VENOUS BLD VENIPUNCTURE: CPT

## 2024-02-01 PROCEDURE — 3060F POS MICROALBUMINURIA REV: CPT | Performed by: INTERNAL MEDICINE

## 2024-02-01 PROCEDURE — 83036 HEMOGLOBIN GLYCOSYLATED A1C: CPT

## 2024-02-04 DIAGNOSIS — E11.65 TYPE 2 DIABETES MELLITUS WITH HYPERGLYCEMIA, UNSPECIFIED WHETHER LONG TERM INSULIN USE (HCC): ICD-10-CM

## 2024-02-06 ENCOUNTER — OFFICE VISIT (OUTPATIENT)
Dept: INTERNAL MEDICINE CLINIC | Facility: CLINIC | Age: 59
End: 2024-02-06
Payer: COMMERCIAL

## 2024-02-06 ENCOUNTER — TELEPHONE (OUTPATIENT)
Dept: ENDOCRINOLOGY CLINIC | Facility: CLINIC | Age: 59
End: 2024-02-06

## 2024-02-06 VITALS
HEART RATE: 78 BPM | RESPIRATION RATE: 15 BRPM | HEIGHT: 61 IN | SYSTOLIC BLOOD PRESSURE: 126 MMHG | TEMPERATURE: 98 F | DIASTOLIC BLOOD PRESSURE: 70 MMHG | OXYGEN SATURATION: 99 % | BODY MASS INDEX: 34.17 KG/M2 | WEIGHT: 181 LBS

## 2024-02-06 DIAGNOSIS — R80.9 TYPE 2 DIABETES MELLITUS WITH MICROALBUMINURIA, UNSPECIFIED WHETHER LONG TERM INSULIN USE  (HCC): Primary | ICD-10-CM

## 2024-02-06 DIAGNOSIS — R45.89 SADNESS: ICD-10-CM

## 2024-02-06 DIAGNOSIS — E78.5 HYPERLIPIDEMIA, UNSPECIFIED HYPERLIPIDEMIA TYPE: ICD-10-CM

## 2024-02-06 DIAGNOSIS — I10 HYPERTENSION, UNSPECIFIED TYPE: ICD-10-CM

## 2024-02-06 DIAGNOSIS — D72.820 LYMPHOCYTOSIS: ICD-10-CM

## 2024-02-06 DIAGNOSIS — R79.89 ABNORMAL CBC MEASUREMENT: ICD-10-CM

## 2024-02-06 DIAGNOSIS — H61.21 IMPACTED CERUMEN OF RIGHT EAR: ICD-10-CM

## 2024-02-06 DIAGNOSIS — E55.9 VITAMIN D DEFICIENCY: ICD-10-CM

## 2024-02-06 DIAGNOSIS — E11.29 TYPE 2 DIABETES MELLITUS WITH MICROALBUMINURIA, UNSPECIFIED WHETHER LONG TERM INSULIN USE  (HCC): Primary | ICD-10-CM

## 2024-02-06 PROCEDURE — 3078F DIAST BP <80 MM HG: CPT | Performed by: INTERNAL MEDICINE

## 2024-02-06 PROCEDURE — 3008F BODY MASS INDEX DOCD: CPT | Performed by: INTERNAL MEDICINE

## 2024-02-06 PROCEDURE — 3074F SYST BP LT 130 MM HG: CPT | Performed by: INTERNAL MEDICINE

## 2024-02-06 PROCEDURE — 99214 OFFICE O/P EST MOD 30 MIN: CPT | Performed by: INTERNAL MEDICINE

## 2024-02-06 RX ORDER — ERGOCALCIFEROL 1.25 MG/1
50000 CAPSULE ORAL WEEKLY
Qty: 12 CAPSULE | Refills: 0 | Status: SHIPPED | OUTPATIENT
Start: 2024-02-06 | End: 2024-04-24

## 2024-02-06 RX ORDER — ATORVASTATIN CALCIUM 40 MG/1
40 TABLET, FILM COATED ORAL NIGHTLY
Qty: 90 TABLET | Refills: 3 | Status: SHIPPED | OUTPATIENT
Start: 2024-02-06

## 2024-02-06 RX ORDER — INSULIN LISPRO 100 [IU]/ML
26 INJECTION, SUSPENSION SUBCUTANEOUS 2 TIMES DAILY WITH MEALS
Qty: 18 ML | Refills: 0 | Status: SHIPPED | OUTPATIENT
Start: 2024-02-06

## 2024-02-06 RX ORDER — AMLODIPINE BESYLATE 5 MG/1
5 TABLET ORAL DAILY
Qty: 90 TABLET | Refills: 3 | Status: SHIPPED | OUTPATIENT
Start: 2024-02-06

## 2024-02-06 RX ORDER — LISINOPRIL 40 MG/1
40 TABLET ORAL DAILY
Qty: 90 TABLET | Refills: 3 | Status: SHIPPED | OUTPATIENT
Start: 2024-02-06

## 2024-02-06 RX ORDER — SITAGLIPTIN 100 MG/1
100 TABLET, FILM COATED ORAL DAILY
Qty: 90 TABLET | Refills: 1 | OUTPATIENT
Start: 2024-02-06

## 2024-02-06 NOTE — TELEPHONE ENCOUNTER
CB has appt on hold for pt 2/13/2024 LVM for patient to call back if able to come to appt date. Advice PSR in front as well     2/13/2024 4:15pm

## 2024-02-06 NOTE — PROGRESS NOTES
Subjective:   Marcy Moon is a 58 year old female  who presents for Follow - Up     DM2- not controlled. States she has been taking medication.     Planning on dental work.     HTN- controlled     HLD-statin but not at goal. Reports needing refill.       Vitamin d def- to start supplements   Abnormal cbc- repeat and if abnormal then hematology referral.     Has marital problems and childhood traumas -  interested in therapy- provided resource info    History/Other:    Chief Complaint Reviewed and Verified  No Further Nursing Notes to   Review  Allergies Reviewed  Medications Reviewed  OB Status Reviewed         Current Outpatient Medications   Medication Sig Dispense Refill    Insulin Lispro Prot & Lispro (HUMALOG MIX 75/25 KWIKPEN) (75-25) 100 UNIT/ML SC SUPN Inject 26 Units into the skin 2 (two) times daily with meals. Need appointment for further medications 18 mL 0    ergocalciferol 1.25 MG (50411 UT) Oral Cap Take 1 capsule (50,000 Units total) by mouth once a week for 12 doses. 12 capsule 0    SITagliptin Phosphate (JANUVIA) 100 MG Oral Tab Take 1 tablet (100 mg total) by mouth daily. 90 tablet 3    atorvastatin 40 MG Oral Tab Take 1 tablet (40 mg total) by mouth nightly. 90 tablet 3    lisinopril 40 MG Oral Tab Take 1 tablet (40 mg total) by mouth daily. 1x daily 90 tablet 3    amLODIPine 5 MG Oral Tab Take 1 tablet (5 mg total) by mouth daily. 90 tablet 3    carbamide peroxide 6.5 % Otic Solution Place 5 drops into the right ear 2 (two) times daily for 3 days. 15 mL 0    metFORMIN HCl 1000 MG Oral Tab Take 1 tablet (1,000 mg total) by mouth 2 (two) times daily with meals. 180 tablet 1    GABAPENTIN 100 MG Oral Cap TAKE 1 CAPSULE BY MOUTH TWICE A DAY FOR 3 DAYS, THEN INCREASE TO 1 CAPSULE THREE TIMES A DAY (EVERY 8 HOURS) 90 capsule 0    CLOTRIMAZOLE-BETAMETHASONE 1-0.05 % External Cream APPLY 1 APPLICATION TOPICALLY TWICE A DAY AS NEEDED 60 g 0    dapagliflozin (FARXIGA) 10 MG Oral Tab Take 1 tablet  (10 mg total) by mouth daily. 90 tablet 3    Insulin Pen Needle 33G X 8 MM Does not apply Misc 1 Device 2 (two) times a day. To use with 70/30 insulin BID with meals 180 each 3       Review of Systems:  Pertinent items are noted in HPI.  A comprehensive 10 point review of systems was completed.  Pertinent positives and negatives noted in the the HPI.        Objective:   /70 (BP Location: Left arm, Patient Position: Sitting, Cuff Size: adult)   Pulse 78   Temp 97.6 °F (36.4 °C) (Temporal)   Resp 15   Ht 5' 1\" (1.549 m)   Wt 181 lb (82.1 kg)   SpO2 99%   BMI 34.20 kg/m²  Estimated body mass index is 34.2 kg/m² as calculated from the following:    Height as of this encounter: 5' 1\" (1.549 m).    Weight as of this encounter: 181 lb (82.1 kg).  PHYSICAL EXAM:   General: no acute distress   Mouth: poor dentition   Ears: R ear with cerumen   Respiratory: no increased work of breathing; good air exchange; CTAB; no crackles or wheezing   Cardiovascular: RRR; S1, S2;   Neurological: awake, alert, oriented x3; CNII-XII grossly intact;  Behavioral/Psych: sad; no SI or HI appropriate affect       Assessment & Plan:     Marcy was seen today for follow - up.    Diagnoses and all orders for this visit:    Type 2 diabetes mellitus with microalbuminuria, unspecified whether long term insulin use  (HCC)  -   increase to   Insulin Lispro Prot & Lispro (HUMALOG MIX 75/25 KWIKPEN) (75-25) 100 UNIT/ML SC SUPN; Inject 26 Units into the skin 2 (two) times daily with meals. Need appointment for further medications  -continue with januvia, metformin and farxiga   -     [facility] Provider  -     Hemoglobin A1C [E]; Future  -     SITagliptin Phosphate (JANUVIA) 100 MG Oral Tab; Take 1 tablet (100 mg total) by mouth daily.    Hypertension, unspecified type  -     lisinopril 40 MG Oral Tab; Take 1 tablet (40 mg total) by mouth daily. 1x daily  -     amLODIPine 5 MG Oral Tab; Take 1 tablet (5 mg total) by mouth  daily.    Hyperlipidemia, unspecified hyperlipidemia type  -     atorvastatin 40 MG Oral Tab; Take 1 tablet (40 mg total) by mouth nightly.  -     Comp Metabolic Panel (14) [E]; Future 5/2024  -     Lipid Panel [E]; Future 5/6/24    Vitamin D deficiency  -     Vitamin D [E]; Future 5/2024  -     ergocalciferol 1.25 MG (03741 UT) Oral Cap; Take 1 capsule (50,000 Units total) by mouth once a week for 12 doses.    Abnormal CBC measurement  Lymphocytosis  -     Oncology/Hematology Referral - Cristhian Motta)    Impacted cerumen of right ear  -     carbamide peroxide 6.5 % Otic Solution; Place 5 drops into the right ear 2 (two) times daily for 3 days.    Sadness  -resources provided    Close follow-up             Abi Jose MD

## 2024-02-13 ENCOUNTER — OFFICE VISIT (OUTPATIENT)
Dept: ENDOCRINOLOGY CLINIC | Facility: CLINIC | Age: 59
End: 2024-02-13
Payer: COMMERCIAL

## 2024-02-13 VITALS
SYSTOLIC BLOOD PRESSURE: 130 MMHG | DIASTOLIC BLOOD PRESSURE: 70 MMHG | HEART RATE: 89 BPM | WEIGHT: 180 LBS | OXYGEN SATURATION: 99 % | BODY MASS INDEX: 34 KG/M2 | RESPIRATION RATE: 16 BRPM

## 2024-02-13 DIAGNOSIS — E11.65 TYPE 2 DIABETES MELLITUS WITH HYPERGLYCEMIA, WITH LONG-TERM CURRENT USE OF INSULIN (HCC): Primary | ICD-10-CM

## 2024-02-13 DIAGNOSIS — E78.5 DYSLIPIDEMIA: ICD-10-CM

## 2024-02-13 DIAGNOSIS — E13.21 NEPHROPATHY DUE TO SECONDARY DIABETES (HCC): ICD-10-CM

## 2024-02-13 DIAGNOSIS — E66.9 OBESITY (BMI 30-39.9): ICD-10-CM

## 2024-02-13 DIAGNOSIS — I10 ESSENTIAL HYPERTENSION: ICD-10-CM

## 2024-02-13 DIAGNOSIS — Z79.4 TYPE 2 DIABETES MELLITUS WITH HYPERGLYCEMIA, WITH LONG-TERM CURRENT USE OF INSULIN (HCC): Primary | ICD-10-CM

## 2024-02-13 LAB
GLUCOSE BLOOD: 94
TEST STRIP LOT #: NORMAL NUMERIC

## 2024-02-13 PROCEDURE — 99417 PROLNG OP E/M EACH 15 MIN: CPT | Performed by: NURSE PRACTITIONER

## 2024-02-13 PROCEDURE — 3075F SYST BP GE 130 - 139MM HG: CPT | Performed by: NURSE PRACTITIONER

## 2024-02-13 PROCEDURE — 82947 ASSAY GLUCOSE BLOOD QUANT: CPT | Performed by: NURSE PRACTITIONER

## 2024-02-13 PROCEDURE — 3078F DIAST BP <80 MM HG: CPT | Performed by: NURSE PRACTITIONER

## 2024-02-13 PROCEDURE — 99215 OFFICE O/P EST HI 40 MIN: CPT | Performed by: NURSE PRACTITIONER

## 2024-02-13 RX ORDER — ACYCLOVIR 400 MG/1
1 TABLET ORAL
Qty: 3 EACH | Refills: 3 | Status: SHIPPED | OUTPATIENT
Start: 2024-02-13

## 2024-02-13 RX ORDER — AMOXICILLIN AND CLAVULANATE POTASSIUM 875; 125 MG/1; MG/1
TABLET, FILM COATED ORAL
COMMUNITY
Start: 2024-02-09

## 2024-02-13 NOTE — PATIENT INSTRUCTIONS
Veremos si dexcom puede ser aprobado con mahmood seguro.  No recojas si el costo es más de $80.    Es posible que tengamos que enviar el pedido a otra empresa con [employer].        Me gustaría dejar de grant Januvia porque no creo que ayude a mahmood diabetes.  continuar  Metformina 1000 mg dos veces al día  Farxiga 10 mg estella vez al día      la farxia tiene un cupón que debería ofrecer el medicamento por 0$ de costo    Envié la receta con el cupón para starr si mahmood costo mejora    Colocaremos un sensor emiliano en mahmood brazo para controlar mahmood nivel de azúcar en benjie salomón la próxima semana, ya que siento que tiene niveles bajos de azúcar en benjie que no está detectando.    Puedes ducharte; bañarte con lanie sensor encendido.      Disminuir premezcla de Humalo-25 pluma flexible: 20 unidades dos veces al día      Continuaremos trabajando juntos para mantener jordan niveles de azúcar en benjie en un objetivo saludable.      El objetivo principal del tratamiento de la diabetes es evitar que mahmood nivel de azúcar suba demasiado. Medimos jordan tendencias generales de azúcar en benjie con estella prueba de hemoglobina A1C. (también llamado A1C) Para la mayoría de las personas, el objetivo es menos del 7.0%, sony a veces hacemos excepciones basadas en la edad, el historial de dafne y otros factores.  Mantener un A1C por debajo del 7% ayuda a prevenir problemas de dafne relacionados con la diabetes.  Si mahmood A1C aumenta demasiado, entonces debemos hablar sobre cambiar mahmood tratamiento actual para la diabetes.    Es importante grant todos jordan medicamentos según lo prescrito.  Además, llámeme si tiene algún problema con preguntas sobre medicamentos, efectos secundarios, preguntas sobre la dosificación o problemas con las tendencias de azúcar en benjie ANTES DE CAMBIAR O DETENER CUALQUIER MEDICAMENTO.    Prueba de azúcar en benjie:  Recuerde llevar mahmood medidor de glucosa o registro de azúcar en benjie a cada alicia en el centro de diabetes.  [place] me  permite realizar ajustes de forma keith en mahmood plan de diabetes.  Para poder determinar cualquier patrón en mahmood nivel de azúcar en benjie, necesitará medir mahmood nivel de azúcar en benjie 2 veces al día  Momentos recomendados para realizar la prueba: antes del desayuno (en ayunas) y luego alternar la prueba de azúcar en benjie 2 horas después de las comidas.    Objetivos de azúcar en benjie:  Antes del desayuno:  (preferiblemente menos de 110)  2 horas Después de las comidas: menos de 180 (preferiblemente menos de 150)  Solicite niveles de azúcar en ebnjie persistentes inferiores a 75 o superiores a 200.  Los niveles de azúcar en benjie superiores a 200 no son aceptables para alcanzar mahmood objetivo de mejorar la diabetes  Esté atento a los niveles bajos de azúcar en benjie: (menos de 70)      Síntomas de niveles bajos de azúcar en benjie:  Temblores o mareos  Piel fría y húmeda o sudorosa  Tener hambre  Dolor de rafa  Nerviosismo  Un latido maria dolores y rápido  Debilidad  Confusión o irritabilidad  Arona borrosa  Tener pesadillas o despertarse confundido o sudando  Entumecimiento u hormigueo en los labios o la lengua    Plan de acción para el tratamiento de la hipoglucemia  1. Revise la glucosa en benjie para asegurarse de que esté baja. No siempre puedes seguir los síntomas. En anuj de nadya, trate mahmood nivel bajo de glucosa en benjie de todos modos.  2. [place] 15 gramos de carbohidratos (carbohidratos). Aquí hay algunas opciones:  4 onzas. jugo de fruta regular  3-4 tabletas de glucosa  6 onzas. refresco regular  7-8 gominolas  3. Vuelva a controlar la glucosa en benjie después de 10 a 15 minutos. Si la glucosa en benjie sigue siendo baja (menos de 70 mg / dl) repita el tratamiento (paso 2).  4. Si falta más de estella hora para mahmood próxima comida, coma un bocadillo pequeño.  5. Si no está seguro de la causa de mahmood nivel bajo de glucosa en benjie, llame a mahmood proveedor de atención médica.  6. Siempre controle mahmood glucosa en  benjie antes de conducir          Nadeem  Heidy Grover  [phone]

## 2024-02-13 NOTE — PROGRESS NOTES
Marcy Moon is a 58 year old presenting today to establish for type 2 diabetes management.   Primary care physician: Abi Jose MD  Most recent  A1C 9.8% ( last A1C  8.7% )   Namibian speaking ; utilizing  today   BG today in office 94 mg/dl (last meal 8 hrs )     Has ongoing dental issues - having all her teeth extracted and will having dentures --> surgery is planned for early March   Feels appetite is down  Just refilled her insulin and januvia, farxiga   Denies mycotic infections or UTI   Cost of DM meds ~ 100- 150$ for orals and insulin     Is interested in personal sensor       Diagnosed in her 30s. ( )   Gestational diabetes X 1 pregnancy () ; after 2nd delivery she was not feeling well and Diabetes was diagnosed ()   Family history: strong family history      Has 5 children ; 4 of 5 have diabetes   Monitoring blood glucose: 1x daily    Highest glucose readin   mg/dl  Lowest glucose readin   mg/dl  Hypoglycemia frequency denies     Breakfast: cereal w mild   Lunch 2 quesadilla   Dinner: whatever she finds in fridge but typically meat/rice    Admits to drinking juice or milk  on occasion. . No soda      Only injecting to mid abd L/R side           Diabetes History:  Type 2 DM ~      Patient has not had hospitalizations for blood sugar issues  denies any history of pancreatitis      Previous DM therapies:  NOVOLOG 70-30 ( insurance formulary )     Current DM Regimen:  Metformin 1000mg twice daily   Farxiga 10mg once daily   Januvia 100 mg once daily   Humalog pre mix: 75-25 flex pen: 24 units twice daily       HGBA1C:    Lab Results   Component Value Date    A1C 9.8 (H) 2024    A1C 8.7 (A) 08/10/2023    A1C 10.2 (A) 2022     (H) 2024       Lab Results   Component Value Date    CHOLEST 204 (H) 2024    CHOLEST 154 2023    TRIG 206 (H) 2024    TRIG 212 (H) 2023    HDL 45 2024    HDL 44 2023    LDL  123 (H) 02/01/2024    LDL 75 02/27/2023     Lab Results   Component Value Date    MICROALBCREA 70.4 (H) 02/01/2024      Lab Results   Component Value Date    CREATSERUM 0.65 02/01/2024    CREATSERUM 0.67 07/26/2022    EGFRCR 103 02/01/2024     Lab Results   Component Value Date    AST 19 02/01/2024    AST 12 (L) 07/26/2022    ALT 23 02/01/2024    ALT 24 07/26/2022       Lab Results   Component Value Date    TSH 2.390 02/01/2024    TSH 3.120 07/26/2022           DM Complications:  Microvascular:   Neuropathy: yes  Retinopathy: no  Nephropathy: yes    Macrovascular:  PVD: no  CAD: no  Stroke/CVA: no        Modifying factors:  Medication adherence: yes   Barriers:lack of diabetes education    Recent steroids, illness or infections ( past 3m): no     Allergies: Patient has no known allergies.    Past Medical History:   Diagnosis Date    Diabetes mellitus (HCC)     Essential hypertension      History reviewed. No pertinent surgical history.  Social History     Socioeconomic History    Marital status:    Tobacco Use    Smoking status: Never    Smokeless tobacco: Never   Vaping Use    Vaping Use: Never used   Substance and Sexual Activity    Alcohol use: Not Currently    Drug use: Never     Family History   Problem Relation Age of Onset    Diabetes Father     Diabetes Maternal Grandmother     Diabetes Sister     Diabetes Brother      Current Medication List:   Current Outpatient Medications   Medication Sig Dispense Refill    amoxicillin clavulanate 875-125 MG Oral Tab TAKE ONE TABLET BY MOUTH EVERY 12 HOURS TILL FINISHED      dapagliflozin (FARXIGA) 10 MG Oral Tab Take 1 tablet (10 mg total) by mouth daily. 90 tablet 3    Continuous Blood Gluc Sensor (DEXCOM G7 SENSOR) Does not apply Misc 1 each Every 10 days. 3 each 3    Insulin Lispro Prot & Lispro (HUMALOG MIX 75/25 KWIKPEN) (75-25) 100 UNIT/ML SC SUPN Inject 26 Units into the skin 2 (two) times daily with meals. Need appointment for further medications 18 mL 0     ergocalciferol 1.25 MG (02587 UT) Oral Cap Take 1 capsule (50,000 Units total) by mouth once a week for 12 doses. 12 capsule 0    SITagliptin Phosphate (JANUVIA) 100 MG Oral Tab Take 1 tablet (100 mg total) by mouth daily. 90 tablet 3    atorvastatin 40 MG Oral Tab Take 1 tablet (40 mg total) by mouth nightly. 90 tablet 3    lisinopril 40 MG Oral Tab Take 1 tablet (40 mg total) by mouth daily. 1x daily 90 tablet 3    amLODIPine 5 MG Oral Tab Take 1 tablet (5 mg total) by mouth daily. 90 tablet 3    metFORMIN HCl 1000 MG Oral Tab Take 1 tablet (1,000 mg total) by mouth 2 (two) times daily with meals. 180 tablet 1    GABAPENTIN 100 MG Oral Cap TAKE 1 CAPSULE BY MOUTH TWICE A DAY FOR 3 DAYS, THEN INCREASE TO 1 CAPSULE THREE TIMES A DAY (EVERY 8 HOURS) 90 capsule 0    CLOTRIMAZOLE-BETAMETHASONE 1-0.05 % External Cream APPLY 1 APPLICATION TOPICALLY TWICE A DAY AS NEEDED 60 g 0    Insulin Pen Needle 33G X 8 MM Does not apply Misc 1 Device 2 (two) times a day. To use with 70/30 insulin BID with meals 180 each 3           DM associated review of  symptoms:   Endocrine: Polyuria, polyphagia, polydipsia: no  Neurological: Paresthesias: yes LE   HEENT: Blurred vision: no  Skin: no rash or wounds  Hematological: Hypoglycemia: no      Review of Systems     LUNGS: denies shortness of breath   CARDIOVASCULAR: denies chest pain  GI: denies abdominal pain, nausea or diarrhea   : denies dysuria  MUSCULOSKELETAL: denies pain        Physical exam:  /70   Pulse 89   Resp 16   Wt 180 lb (81.6 kg)   SpO2 99%   BMI 34.01 kg/m²   Body mass index is 34.01 kg/m².    Physical Exam   Vitals reviewed.  Constitutional: Normal appearance   Cardiovascular: Normal rate , rhythm   Pulmonary/Chest: Effort normal  Neurological: Alert and oriented .   Psychiatric: Normal mood and affect.   Skin: + presence of lipo hypertrophy due to lack of insulin rotation  on R mid abd     Assessment/Plan:    Hypertension   Well controlled but needs  ongoing monitoring   CPM       Nephropathy   On ace rx , Farxiga   Encouraged improved glycemic targets     Dyslipidemia:   Last  labs done 2-2024   Continue  Atorvastatin rx      Obesity  Would benefit from GLP   Will address at f/u   Declined due to cost today -- see DM note       Type 2 diabetes mellitus with hyperglycemia, with long-term current use of insulin (HCC)  A1C: 9.8%   Weight: 180 lb   Diabetes control is sub optimal.  Reviewed with patient health impact associated with high glucose trends and the importance of better glucose control to prevent onset /progression of DM complications.         Recommendations:   Place emiliano pro; due to hypoglycemia suspect   Resistant to rx changes today due to just spending >100$ on her DM med  refills   Would recommend stopping januvia and visiting GLP1, such as Ozempic for better A1C improvement, weight loss and CVD benefits (and upcoming renal benefits will be released from FLOW trial)     For now   Decrease Humalog 75-25 : 24 --> 20 units twice daily     Continue  Januvia 100mg once daily   Metformin 1000mg twice daily   Farxiga 10mg once daily     Desires to pursue personal CGM --> dexcom G7 to pharmacy   Advised NOT to  if cost > $80 --> will pursue w DME     Avoid current sites for insulin injections due to scarring; handouts provided for site rotation and using new sites for insulin     Would prefer to use basal /bolus versus pre mixed insulin and cost should be similar with current co pay card offers     Reviewed w patient pathophysiology of diabetes, clinical significance of A1c, adverse effects of suboptimal glucose control, and goals of therapy   Reviewed the A1C test, what the value reflects and the goal for the patient.   Reminded pt on A1C and blood sugar targets (Fasting < 130 and post prandial <180 ) and complications associated with hyperglycemia and uncontrolled DM (on AVS)   Recommended SMBG 2- x daily if not using CGM   Reviewed s/s and  treatment of hypoglycemia (on AVS)   Reminded to continue with lifestyle modifications since they have positive impact on diabetes/blood sugars/health (portion control, physical activity, weight loss)   Reinforced timing and adherence with medication, self-monitoring of blood glucose and routine follow up    Recommended for  patient to follow up in Diabetes center to review carb goals, food label reading, and offer further support/guidance with exercise planning and weight loss.     The patient is asked to return in 1` week for sensor review and 4- 6 weeks in office  but recommended to contact DM clinic sooner if questions or concerns.    The patient indicates understanding of these issues and agrees to the plan.      Orders Placed This Encounter    ASSAY QUANTITATIVE, GLUCOSE     Order Specific Question:   Release to patient     Answer:   Immediate    amoxicillin clavulanate 875-125 MG Oral Tab     Sig: TAKE ONE TABLET BY MOUTH EVERY 12 HOURS TILL FINISHED    dapagliflozin (FARXIGA) 10 MG Oral Tab     Sig: Take 1 tablet (10 mg total) by mouth daily.     Dispense:  90 tablet     Refill:  3     BIN:[id number] PCN:[id number] GR: [id number] ID:[id number]    Continuous Blood Gluc Sensor (DEXCOM G7 SENSOR) Does not apply Misc     Si each Every 10 days.     Dispense:  3 each     Refill:  3     DM quality  A1C/Blood pressure: as reported above   Nephropathy screenin.  continue ace /arb rx.   LIPID screenin-  . atorva statin rx.   Last dilated eye exam: No data recorded Exam shows retinopathy? No data recorded  Last diabetic foot exam: Last Foot Exam: 08/10/23    Defer foot exam and retina camera until  follow up appointment due to extended time   Lithuanian translation w AVS     Spent 60 min obtaining patient history, evaluating patient, reviewing blood glucose trends, discussing treatment options, lifestyle modifications and completing documentation -  The risks and benefits of my recommendations, as well as other  treatment options were discussed with the patient today. questions were also answered to the best of my knowledge.

## 2024-02-14 NOTE — PROGRESS NOTES
A continuous glucose sensor for 24 hour blood sugar monitoring was placed on patient today per APN order.   Serial NUMBER: [id number]    - After cleansing skin with alcohol prep, Sensor (Julia pro )was inserted on  LEFT  Posterior upper arm area without difficulty. Small amount of skin prep was added around sensor tape after placement to help with sensor adhesive.    Area remains free of any bleeding or irritation or pain at site when patient left DM center.  Patient instructions:   Record daily food/drink intake and activity in log book  Call Diabetes center with any questions or concerns.   instructed to record food, exercise, insulin doses (if taking) on log provided

## 2024-02-21 ENCOUNTER — TELEPHONE (OUTPATIENT)
Dept: ENDOCRINOLOGY CLINIC | Facility: CLINIC | Age: 59
End: 2024-02-21

## 2024-02-21 ENCOUNTER — NURSE ONLY (OUTPATIENT)
Dept: ENDOCRINOLOGY CLINIC | Facility: CLINIC | Age: 59
End: 2024-02-21
Payer: COMMERCIAL

## 2024-02-21 DIAGNOSIS — E11.65 TYPE 2 DIABETES MELLITUS WITH HYPERGLYCEMIA, WITH LONG-TERM CURRENT USE OF INSULIN (HCC): Primary | ICD-10-CM

## 2024-02-21 DIAGNOSIS — Z79.4 TYPE 2 DIABETES MELLITUS WITH HYPERGLYCEMIA, WITH LONG-TERM CURRENT USE OF INSULIN (HCC): Primary | ICD-10-CM

## 2024-02-21 PROCEDURE — 95251 CONT GLUC MNTR ANALYSIS I&R: CPT | Performed by: NURSE PRACTITIONER

## 2024-02-21 PROCEDURE — 95250 CONT GLUC MNTR PHYS/QHP EQP: CPT | Performed by: NURSE PRACTITIONER

## 2024-02-21 RX ORDER — ACYCLOVIR 400 MG/1
1 TABLET ORAL AS DIRECTED
Qty: 1 EACH | Refills: 0 | Status: SHIPPED | OUTPATIENT
Start: 2024-02-21

## 2024-02-21 NOTE — TELEPHONE ENCOUNTER
CAROL CGM Analysis of data: 2.13.2024 thru 2.21.2024   Sensor download: full report in media  Average glucose : 172 mg/dl       CV (coefficient of variation) : 40.5%   GMI (estimated A1C) 7.4 %    24% time above 180mg /dl  ( last download: -%)      14% time above 250 mg/dl ( last download: -%)      60% time in target range:  mg/dl ( last download: -%)      2% time below 70mg/dl ( last download: -%)      0 %time below 54mg/dl ( last download: -%)      Findings:   1. Pattern of hypoglycemia: yes, 29 min , highest risk and pattern overnight    2. Time in target: 60  % (ADA recommended goal > 70%)   3. +  postprandial elevation after 9 am   4. Very high  glucose variability (target < 36%)    Current DM meds :   Januvia 100mg once daily (just filled on 2-6-2024)  Metformin 1000mg twice daily   Farxiga 10mg once daily     Humalog 75-25 : 20 units twice daily       Contacted daughter per pt request (ok per HIPAA)   Talked w daughter Annette   Since mother is not eating from dental extractions and hypoglycemia on Feb 18, she has not had insulin since 2-   Still not eating ; only tolerating fluids.     Discussed patient would benefit from GLP, starting basal/prandial MDI   But desires to give mom time to recover from oral surgery   For now, ok to HOLD pre mixed insulin   Check BG at least 2 x daily   If trends start increasing over 150 -160, contact DM center    Continue;   Januvia 100mg once daily     Metformin 1000mg twice daily   Farxiga 10mg once daily     Advised to stay hydrated and no risk of hypogclyemia with above meds when off insulin   Family states Dexcom NOT covered- insurance wont approve  Will investigate

## 2024-02-21 NOTE — TELEPHONE ENCOUNTER
Attempted to start PA for , but coverage was not found (was not generated by pharmacy yet).  Will need to look into further tomorrow.

## 2024-02-21 NOTE — TELEPHONE ENCOUNTER
Nothing in rightfax, checked covermymeds and PA was there. Will complete as sometimes they only ask for diagnosis code or insulin status.      (Key: [id number])    Your information has been sent to [facility].    Update:  Key: [id number])    Approvedtoday  PA Case: [id number], Status: Approved, Coverage Starts on: 2/21/2024 12:00:00 AM, Coverage Ends on: 2/20/2025 12:00:00 AM.

## 2024-02-22 NOTE — TELEPHONE ENCOUNTER
Contacted pharmacy, G7  went through, sensors previously approved.  Unsure if patient needs , contacted pt's daughter Tay.  Pt will have an iphone 11 (just needs to transfer information) so will be compatible with G7 and clarity beatris.  Informed daughter of the apps she will need on pt's phone.

## 2024-03-04 ENCOUNTER — TELEPHONE (OUTPATIENT)
Dept: ENDOCRINOLOGY CLINIC | Facility: CLINIC | Age: 59
End: 2024-03-04

## 2024-03-04 NOTE — TELEPHONE ENCOUNTER
Daughter left message requesting call back regarding insulin and dexcom.  Returned call, left message to call office.

## 2024-03-06 NOTE — TELEPHONE ENCOUNTER
Call with daughter, Annette.  Pt is not using , so we will have to get her clarity beatris connected.  Blanka sent instructions earlier to day, that Annette's sister has access to pt's [employer]. They are going to work on getting connected tonight.  Please check again tomorrow for streaming.

## 2024-03-06 NOTE — TELEPHONE ENCOUNTER
Daughter called back - states her mom placed the dexcom on 3/01 and has been having high blood sugars in the morning    They are not currently streaming with dexcom but they have the clarity beatris. Told daughter I would send directions for streaming and they will try to get connected in a day or so for CB to review    Confirmed current meds:    Januvia 100mg once daily - daughter said once pt finished with current supply was supposed to stop, said she'd check with her mom if she finished supply yet    Metformin 1000mg twice daily     Farxiga 10mg once daily - is out right now    Humalog 20 units - had stopped the insulin however Saturday/Sunday pt was having high sugars/headaches so daughter told her to dose 20 units each day and it did help bring her sugars down    Daughter is wondering if her mom should start back up on the insulin/how much to take

## 2024-03-07 ENCOUNTER — TELEMEDICINE (OUTPATIENT)
Dept: ENDOCRINOLOGY CLINIC | Facility: CLINIC | Age: 59
End: 2024-03-07
Payer: COMMERCIAL

## 2024-03-07 DIAGNOSIS — E78.5 DYSLIPIDEMIA: ICD-10-CM

## 2024-03-07 DIAGNOSIS — E11.65 TYPE 2 DIABETES MELLITUS WITH HYPERGLYCEMIA, WITH LONG-TERM CURRENT USE OF INSULIN (HCC): Primary | ICD-10-CM

## 2024-03-07 DIAGNOSIS — E66.9 OBESITY (BMI 30-39.9): ICD-10-CM

## 2024-03-07 DIAGNOSIS — E13.21 NEPHROPATHY DUE TO SECONDARY DIABETES (HCC): ICD-10-CM

## 2024-03-07 DIAGNOSIS — Z79.4 TYPE 2 DIABETES MELLITUS WITH HYPERGLYCEMIA, WITH LONG-TERM CURRENT USE OF INSULIN (HCC): Primary | ICD-10-CM

## 2024-03-07 DIAGNOSIS — I10 ESSENTIAL HYPERTENSION: ICD-10-CM

## 2024-03-07 PROCEDURE — 99214 OFFICE O/P EST MOD 30 MIN: CPT | Performed by: NURSE PRACTITIONER

## 2024-03-07 PROCEDURE — 95251 CONT GLUC MNTR ANALYSIS I&R: CPT | Performed by: NURSE PRACTITIONER

## 2024-03-07 RX ORDER — SEMAGLUTIDE 0.68 MG/ML
0.5 INJECTION, SOLUTION SUBCUTANEOUS WEEKLY
Qty: 9 ML | Refills: 0 | Status: SHIPPED | OUTPATIENT
Start: 2024-03-07

## 2024-03-07 NOTE — TELEPHONE ENCOUNTER
Dexcom download: 3-1.2024 thru 3.6.2024     Coefficient of variation: 26.4   GMI: estimated A1C 7.9     Average glucose: 193 mg/dl     49% In target range(70-180mg/dl)     35%High glucose targets (> 180mg/dl) :     16%Very high glucose targets:  (> 250mg/dl)     0%Low glucose targets:(less than 70mg/dl)     0%Very Low glucose targets: (< 54mg/dl ) :     Findings:   Hypoglycemia: none      Time in target: 49 %  (ADA recommended target > 70%)     Spiking after 9 am and not returning to baseline    Current DM meds:   Januvia 100mg once daily   Metformin 1000mg twice daily   Farxiga 10mg once daily (out)   Humalog pre mix ed insulin: 75-25 : recently resume 20 units  daily (?time)     Discussed changing from januvia to GLP1 rx - but wanted to finish up supply   Would prefer pt change from pre mixed insulin to MDI w basal bolus

## 2024-03-07 NOTE — PROGRESS NOTES
Due to COVID-19 ACTION PLAN, the patient's office visit was converted to a video visit. Please note that the following visit was completed using two-way, real-time interactive audio and video communication.  Time Spent:  17min    Marcy Moon is a 58 year old presenting for  type 2 diabetes management.   Primary care physician: Abi Jose MD  Most recent  A1C 9.8% ( last A1C  8.7% )   Daughter in on video visit w patient today       Was started on dexcom G7   Had dental extractions and was not eating so insulin was stopped insulin   Now that she can tolerate food ; now eating soft foods  and drinking smoothies   Her trends have increased   Patient messaged in yesterday reporting high blood sugars   Has not been able to get Farxiga (? PA) but no outreach by her pharmacy       Dexcom download: 3-1.2024 thru 3.6.2024      Coefficient of variation: 26.4   GMI: estimated A1C 7.9      Average glucose: 193 mg/dl (last upload 172 mg/dl)     49% In target range(70-180mg/dl) ( last download: 60 %)       35%High glucose targets (> 180mg/dl) : ( last download: 24%)       16%Very high glucose targets:  (> 250mg/dl) ( last download: 14%)       0%Low glucose targets:(less than 70mg/dl) ( last download: 2%)       0%Very Low glucose targets: (< 54mg/dl ) : ( last download: 0%)       Findings:   Hypoglycemia: none    but highest risk is 4- 6am   Time in target: 49 %  (ADA recommended target > 70%)    Spiking after 9 am and not returning to baseline until after 2 am      Diabetes History:  Type 2 DM ~ 2000     Patient has not had hospitalizations for blood sugar issues  denies any history of pancreatitis      Previous DM therapies:  NOVOLOG 70-30 ( insurance formulary )     Current DM Regimen:  Metformin 1000mg twice daily   Farxiga 10mg once daily -->not taking  Januvia 100 mg once daily   Humalog pre mix: 75-25 flex pen: 24 units twice daily --> dosing 20 units  8am and 8pm       HGBA1C:    Lab Results   Component Value  Date    A1C 9.8 (H) 02/01/2024    A1C 8.7 (A) 08/10/2023    A1C 10.2 (A) 12/05/2022     (H) 02/01/2024       Lab Results   Component Value Date    CHOLEST 204 (H) 02/01/2024    CHOLEST 154 02/27/2023    TRIG 206 (H) 02/01/2024    TRIG 212 (H) 02/27/2023    HDL 45 02/01/2024    HDL 44 02/27/2023     (H) 02/01/2024    LDL 75 02/27/2023     Lab Results   Component Value Date    MICROALBCREA 70.4 (H) 02/01/2024      Lab Results   Component Value Date    CREATSERUM 0.65 02/01/2024    CREATSERUM 0.67 07/26/2022    EGFRCR 103 02/01/2024     Lab Results   Component Value Date    AST 19 02/01/2024    AST 12 (L) 07/26/2022    ALT 23 02/01/2024    ALT 24 07/26/2022       Lab Results   Component Value Date    TSH 2.390 02/01/2024    TSH 3.120 07/26/2022           DM Complications:  Microvascular:   Neuropathy: yes  Retinopathy: no  Nephropathy: yes    Macrovascular:  PVD: no  CAD: no  Stroke/CVA: no        Modifying factors:  Medication adherence: yes   Soft diet due to recent dental extractions   Recent steroids, illness or infections ( past 3m): no     Allergies: Patient has no known allergies.    Past Medical History:   Diagnosis Date    Diabetes mellitus (HCC)     Essential hypertension      History reviewed. No pertinent surgical history.  Social History     Socioeconomic History    Marital status:    Tobacco Use    Smoking status: Never    Smokeless tobacco: Never   Vaping Use    Vaping Use: Never used   Substance and Sexual Activity    Alcohol use: Not Currently    Drug use: Never     Family History   Problem Relation Age of Onset    Diabetes Father     Diabetes Maternal Grandmother     Diabetes Sister     Diabetes Brother      Current Medication List:   Current Outpatient Medications   Medication Sig Dispense Refill    semaglutide (OZEMPIC, 0.25 OR 0.5 MG/DOSE,) 2 MG/3ML Subcutaneous Solution Pen-injector Inject 0.5 mg into the skin once a week. 9 mL 0    amoxicillin clavulanate 875-125 MG Oral Tab  TAKE ONE TABLET BY MOUTH EVERY 12 HOURS TILL FINISHED      dapagliflozin (FARXIGA) 10 MG Oral Tab Take 1 tablet (10 mg total) by mouth daily. 90 tablet 3    Continuous Blood Gluc Sensor (DEXCOM G7 SENSOR) Does not apply Misc 1 each Every 10 days. 3 each 3    Insulin Lispro Prot & Lispro (HUMALOG MIX 75/25 KWIKPEN) (75-25) 100 UNIT/ML SC SUPN Inject 26 Units into the skin 2 (two) times daily with meals. Need appointment for further medications 18 mL 0    ergocalciferol 1.25 MG (75416 UT) Oral Cap Take 1 capsule (50,000 Units total) by mouth once a week for 12 doses. 12 capsule 0    atorvastatin 40 MG Oral Tab Take 1 tablet (40 mg total) by mouth nightly. 90 tablet 3    lisinopril 40 MG Oral Tab Take 1 tablet (40 mg total) by mouth daily. 1x daily 90 tablet 3    amLODIPine 5 MG Oral Tab Take 1 tablet (5 mg total) by mouth daily. 90 tablet 3    metFORMIN HCl 1000 MG Oral Tab Take 1 tablet (1,000 mg total) by mouth 2 (two) times daily with meals. 180 tablet 1    GABAPENTIN 100 MG Oral Cap TAKE 1 CAPSULE BY MOUTH TWICE A DAY FOR 3 DAYS, THEN INCREASE TO 1 CAPSULE THREE TIMES A DAY (EVERY 8 HOURS) 90 capsule 0    CLOTRIMAZOLE-BETAMETHASONE 1-0.05 % External Cream APPLY 1 APPLICATION TOPICALLY TWICE A DAY AS NEEDED 60 g 0    Insulin Pen Needle 33G X 8 MM Does not apply Misc 1 Device 2 (two) times a day. To use with 70/30 insulin BID with meals 180 each 3           DM associated review of  symptoms:   Endocrine: Polyuria, polyphagia, polydipsia: no  Neurological: Paresthesias: yes LE   HEENT: Blurred vision: no  Skin: no rash or wounds  Hematological: Hypoglycemia: no      Review of Systems     LUNGS: denies shortness of breath   CARDIOVASCULAR: denies chest pain  GI: denies abdominal pain, nausea or diarrhea   : denies dysuria  MUSCULOSKELETAL: denies pain        Physical exam:  There were no vitals taken for this visit.  There is no height or weight on file to calculate BMI.    Physical Exam     Constitutional: Normal  appearance   Cardiovascular: Not assessed today   Pulmonary/Chest: Effort normal  Neurological: Alert and oriented .   Psychiatric: Normal mood and affect.        Assessment/Plan:    Hypertension  needs ongoing monitoring   CPM       Nephropathy   Continue  ace rx , Farxiga   Encouraged improved glycemic targets to help kidneys long term health     Dyslipidemia:   Last  labs done 2-2024   Continue  Atorvastatin rx      Obesity  Will start  GLP --> see DM note         Type 2 diabetes mellitus with hyperglycemia, with long-term current use of insulin ([facility])  A1C: 9.8% (update due 5-2024)  Weight: 180 lb   Diabetes control is sub optimal.  Reviewed with patient health impact associated with high glucose trends and the importance of better glucose control to prevent onset /progression of DM complications.     Will stop Januvia ; start GLP1 rx    Reviewed the mechanism of action with GLP-1 Rosalio- and now they target 6 of the 8 components of the Ominous Octet  w T2DM     Enhancing insulin secretion in glucose dependent state (no risk of hypoglycemia)   2/3. Inhibit glucagon secretion which results in reduction in hepatic glucose production   4. Corrects the gastrointestinal incretin defect   5. Exert a central effect on the brain to suppress appetite~ which promotes weight loss  6. Weight loss enhances both muscle and hepatic sensitivity to insulin     Reviewed action, risk vs benefit, dosing, and potential side effects of GLP-1 medications.  Patient denies hx of pancreatitis, gastroparesis, or personal or family hx of medullary thyroid CA or MEN 2. Agreeable to start therapy: Ozempic: 0.25mg once weekly Subcutaneous x 4 weeks then increase Ozempic dose to 0.5mg once weekly if NO GI side effects   For now   Decrease Humalog 75-25 :  20 units  in AM, 10 units in PM   ~hoping we can stop insulin w use of GLP1 rx     Re- start Farxiga 10mg once daily in AM   PA done - prefer for pt to stay on Farxiga since + nephropathy        Reviewed Sick day management and avoidance of keto diets and to Call if any rash, itching in genital area or painful urination develops        Continue    Metformin 1000mg twice daily   Dexcom G7 to pharmacy     Reviewed , clinical significance of A1c, adverse effects of suboptimal glucose control, and goals of therapy   Reviewed the A1C test, what the value reflects and the goal for the patient.   Reminded pt on A1C and blood sugar targets (Fasting < 130 and post prandial <180 ) and complications associated with hyperglycemia and uncontrolled DM (on AVS)   Recommended SMBG 2- x daily if not using CGM   Reviewed s/s and treatment of hypoglycemia (on AVS)   Reminded to continue with lifestyle modifications since they have positive impact on diabetes/blood sugars/health (portion control, physical activity, weight loss)   Reinforced timing and adherence with medication, self-monitoring of blood glucose and routine follow up    Recommended for  patient to follow up in Diabetes center to review carb goals, food label reading, and offer further support/guidance with exercise planning and weight loss.   Dexcom check in 2 weeks   The patient is asked to return in 1` week for sensor review and 4- 6 weeks in office  but recommended to contact DM clinic sooner if questions or concerns.    The patient indicates understanding of these issues and agrees to the plan.      Orders Placed This Encounter    semaglutide (OZEMPIC, 0.25 OR 0.5 MG/DOSE,) 2 MG/3ML Subcutaneous Solution Pen-injector     Sig: Inject 0.5 mg into the skin once a week.     Dispense:  9 mL     Refill:  0     DM quality  A1C/Blood pressure: as reported above   Nephropathy screenin  continue ace /arb rx.   LIPID screenin-  . atorva statin rx.   Last dilated eye exam: No data recorded Exam shows retinopathy? No data recorded  Last diabetic foot exam: Last Foot Exam: 08/10/23      Reviewed demo pen and dosing on video today w  pt/daughter  Details on AVS as well   The risks and benefits of my recommendations, as well as other treatment options were discussed with the patient today. questions were also answered to the best of my knowledge.       This has been done in good sabrina to provide continuity of care in the best interest of the provider-patient relationship, due to the on-going public health crisis/national emergency and because of restrictions of visitation.  There are limitations of this visit as no or only very limited physical exam could be performed.  Every conscious effort was taken to allow for sufficient and adequate time.  This billing visit was spent on reviewing blood sugar trends, DM related labs, medications and decision making.  Appropriate medical decision-making and tests are ordered as detailed in the plan of care above.      Marcy Moon understands phone or video evaluation is not a substitute for face-to-face examination or emergency care. Patient advised to go to ER or call 911 for worsening symptoms or acute distress.     Heidy Grover, APRN

## 2024-03-07 NOTE — PATIENT INSTRUCTIONS
Stop Aluvia     Decrease humalog 75-25 insulin : 10 units in PM   Continue 20 units in am       I would prefer to start more effective diabetes medication that may result in not needing insulin - such as ozempic     Start taking Ozempic ONCE a week.   Dose: 0.25mg once weekly x 4 doses (4 weeks)   If you do not have any GI symptoms (bloating, diarrhea, nausea or vomiting) increase the dose to 0.5mg once a week.     The sample pen that was provided for you will last you 6 weeks before you have to  the medicine supply at the pharmacy.       There is an ozempic co pay card. It now offers $25 for r 90 day supply   You have the option to text for new co pay card  Text [place]GIN to [id number] - Or you can visit: 12Return     Restart Farxiga 10 mg once daily     This medication will remove extra sugar out of blood into your urine.   This medication will not only help improve your blood sugars but have also shown to help protect the kidneys and heart.       It can be dosed anytime of day but morning is probably best time to take it due to increase in urination effect.     Please drink at least 4 glasses of water daily to avoid dehydration       It does not cause low blood sugars (hypoglycemia) . If you develop any low blood sugars, we will need to adjust other medications    Please call me if you develop any symptoms of urinary tract infection (burning with urination) or yeast infection (new rash itching or burning in the genital area)      If you are ever sick and not keeping fluids down, please hold the  Farxiga  until you are tolerating fluids again     Also when taking these medications avoid following a  very-low-carbohydrate or ketogenic diet  as this  could increase the production of ketones, particularly during times of illness, dehydration, and/or metabolic stress, leading to diabetes ketoacidosis    Continue   Dexcom G7   Metformin 1000mg twice daily       Ozempic\ Is considered a Non insulin injectables  (also called a \"gut hormone\" or GLP-1 receptor agonists).     This hormone is very good for diabetes and improving low blood sugar without risk of low blood sugars. It also has properties that is good for protecting the heart, brain and kidneys.       The  medicine  works in the following ways:   1.            By helping beta cells release more insulin when there is food in the stomach and intestines. The increased insulin lowers blood sugars.  2.            By stopping the liver from releasing sugar in to the blood when it is NOT needed.  3.            By slowing the movement of food through the stomach so that glucose (or sugar) enters the blood more slowly   4.            By making you feel full which helps decrease the amount of food that you eat.     Common side effects:   Nausea or diarrhea   These effects usually go away over time as your body gets used to the medicine     Here are some things that might help your nausea go away:     Eat small amounts of food instead of  large meals  Eat plain, bland non greasy foods such as bread, crackers and rice , non seasoned lean proteins such as chicken or fish   Drink plenty of fluids (sugar free)   Avoid foods and smells that might make you sick   Avoid greasy fried or sweet foods.   Avoid lying down after you eat. Wait at least 2 hours   Eat slowly and listen to your hunger     It does not cause low blood sugars; so if you have low blood sugars we will decreas insulin or stop it     Fluctuations in blood sugars are best detected by testing your sugar with your meter.   In order for me to determine any patterns in your blood sugars, you will need to test your blood sugar 1- 2 times daily     It would be best to change up the times of day that you are testing your sugar.   Always test before breakfast (fasting) and then alternate testing blood sugar 1-2 hours after your meals.     American Diabetes Association: blood sugar targets:     Fasting blood sugar (before  breakfast) Target:    (ideally less than 110)  2 hours after eating less than 180 (ideally less than  150 )     Call for blood sugars less than  75 or greater than  200 more than 2 times in a week     If you are wearing the sensor, please look at your trends/averages    Recommendations:   GMI (estimated A1C ) target under  7%     Time in range (healthy blood sugar targets) : goal is over 70%   less than 70 : goal is less than 4%   Over 180 : goal is less than 20 %   Over 250: goal is  less than 5%       Watch for low blood sugars: (less than 70 )    Treatment of Low Blood Glucose Action Plan  1. Check blood glucose to be sure that it is low. You cannot  always go by symptoms or how you feel. If in doubt, treat your low blood glucose anyway.  Rule of 15 :     2. Take 15 grams of carbohydrate (carb). Here are some choices:    4 oz. regular fruit juice  3-4 glucose tablets  6 oz. regular soda   7-8 jelly beans    3. Recheck blood glucose after 10-15 minutes. If blood glucose is still low (less than 70 mg/dl) repeat the treatment (step 2).    4. If your next meal is more than one hour away, eat a small snack.    5. If you’re not sure what caused your low blood glucose, call your healthcare provider.    6. Always check your blood glucose before you drive

## 2024-03-21 ENCOUNTER — APPOINTMENT (OUTPATIENT)
Dept: GENERAL RADIOLOGY | Age: 59
End: 2024-03-21
Attending: EMERGENCY MEDICINE
Payer: COMMERCIAL

## 2024-03-21 ENCOUNTER — HOSPITAL ENCOUNTER (OUTPATIENT)
Age: 59
Discharge: HOME | End: 2024-03-21
Attending: EMERGENCY MEDICINE
Payer: COMMERCIAL

## 2024-03-21 VITALS
SYSTOLIC BLOOD PRESSURE: 149 MMHG | BODY MASS INDEX: 33.13 KG/M2 | DIASTOLIC BLOOD PRESSURE: 74 MMHG | TEMPERATURE: 99 F | HEIGHT: 62 IN | WEIGHT: 180 LBS | OXYGEN SATURATION: 98 % | HEART RATE: 80 BPM | RESPIRATION RATE: 20 BRPM

## 2024-03-21 DIAGNOSIS — J11.1 INFLUENZA: Primary | ICD-10-CM

## 2024-03-21 LAB
POCT INFLUENZA A: NEGATIVE
POCT INFLUENZA B: POSITIVE
SARS-COV-2 RNA RESP QL NAA+PROBE: NOT DETECTED

## 2024-03-21 PROCEDURE — 87502 INFLUENZA DNA AMP PROBE: CPT | Performed by: EMERGENCY MEDICINE

## 2024-03-21 PROCEDURE — 99214 OFFICE O/P EST MOD 30 MIN: CPT

## 2024-03-21 PROCEDURE — 71046 X-RAY EXAM CHEST 2 VIEWS: CPT | Performed by: EMERGENCY MEDICINE

## 2024-03-21 NOTE — ED PROVIDER NOTES
Patient Seen in: [facility]      History     Chief Complaint   Patient presents with    Cough/URI     Stated Complaint: cough,congesiton    Subjective:   HPI    2 weeks of sinus congestion, headache and post nasal drip. Has had cough as well. No documented fever but has had chills.     Objective:   Past Medical History:   Diagnosis Date    Diabetes (HCC)     Diabetes mellitus (HCC)     Essential hypertension               History reviewed. No pertinent surgical history.             Social History     Socioeconomic History    Marital status:    Tobacco Use    Smoking status: Never    Smokeless tobacco: Never   Vaping Use    Vaping Use: Never used   Substance and Sexual Activity    Alcohol use: Not Currently    Drug use: Never              Review of Systems    Positive for stated complaint: cough,congesiton  Other systems are as noted in HPI.  Constitutional and vital signs reviewed.      All other systems reviewed and negative except as noted above.    Physical Exam     ED Triage Vitals [03/21/24 1513]   /74   Pulse 80   Resp 20   Temp 98.8 °F (37.1 °C)   Temp src Temporal   SpO2 98 %   O2 Device None (Room air)       Current:/74   Pulse 80   Temp 98.8 °F (37.1 °C) (Temporal)   Resp 20   Ht 157.5 cm (5' 2\")   Wt 81.6 kg   SpO2 98%   BMI 32.92 kg/m²         Physical Exam  Vitals and nursing note reviewed.   Constitutional:       Appearance: Normal appearance. She is well-developed.   HENT:      Head: Normocephalic and atraumatic.      Nose: Congestion present.   Cardiovascular:      Rate and Rhythm: Normal rate and regular rhythm.      Heart sounds: Normal heart sounds.   Pulmonary:      Effort: Pulmonary effort is normal. No respiratory distress.   Skin:     General: Skin is warm and dry.      Capillary Refill: Capillary refill takes less than 2 seconds.   Neurological:      General: No focal deficit present.      Mental Status: She is alert.      Sensory: No sensory  deficit.   Psychiatric:         Mood and Affect: Mood normal.         Behavior: Behavior normal.              ED Course     Labs Reviewed   POCT FLU TEST - Abnormal; Notable for the following components:       Result Value    POCT INFLUENZA B Positive (*)     All other components within normal limits    Narrative:     This assay is a rapid molecular in vitro test utilizing nucleic acid amplification of influenza A and B viral RNA.   RAPID SARS-COV-2 BY PCR - Normal                      MDM                                      Medical Decision Making    Covid, flu, bacterial pneumonia, viral bronchitis all in differential. COVID negative. Flu positive. CXR images reviewed by myself. No pneumonia. Discharge on over the counter ibuprofen and cold medicine for symptom relief  Disposition and Plan     Clinical Impression:  1. Influenza         Disposition:  Discharge  3/21/2024  4:35 pm    Follow-up:  [facility]  [address] [address]  [phone]      As needed          Medications Prescribed:  Discharge Medication List as of 3/21/2024  4:42 PM

## 2024-03-21 NOTE — ED INITIAL ASSESSMENT (HPI)
Cough, pain with cough, nasal congestion for a few weeks. +Chills. +headache. Denies difficulty breathing.

## 2024-04-01 ENCOUNTER — PATIENT MESSAGE (OUTPATIENT)
Dept: ENDOCRINOLOGY CLINIC | Facility: CLINIC | Age: 59
End: 2024-04-01

## 2024-04-01 DIAGNOSIS — E11.29 TYPE 2 DIABETES MELLITUS WITH MICROALBUMINURIA (HCC): Primary | ICD-10-CM

## 2024-04-01 DIAGNOSIS — R80.9 TYPE 2 DIABETES MELLITUS WITH MICROALBUMINURIA (HCC): Primary | ICD-10-CM

## 2024-04-02 NOTE — TELEPHONE ENCOUNTER
Current medications: Ozempic 0.5 mg (once increased) and Metformin 1000mg twice daily which pt is taking. Stopped Insulin.  Per LOV- pt was to re-start Farxiga 10 mg daily, will confirm- pharmacy stated a new prescription would be needed if pt is to be on the medication  Last Dexcom upload 3/12/24, daughter will be putting it back on- fasting trends have been in 200's    Waiting for response via [facility] to confirm    Hyperglycemia triage:   Confirm current Diabetes medications with patient (not from chart note)   Onset, frequency and duration of symptoms?   Recent illness or steroid prescription/injection?   Obtain Blood sugar readings: BG log or CGM?   Changes in diet? Changes in any medications?

## 2024-04-03 RX ORDER — DAPAGLIFLOZIN 10 MG/1
10 TABLET, FILM COATED ORAL DAILY
Qty: 90 TABLET | Refills: 0 | Status: SHIPPED | OUTPATIENT
Start: 2024-04-03

## 2024-04-24 DIAGNOSIS — R21 RASH: ICD-10-CM

## 2024-04-24 DIAGNOSIS — E55.9 VITAMIN D DEFICIENCY: ICD-10-CM

## 2024-04-24 DIAGNOSIS — G62.9 NEUROPATHY: ICD-10-CM

## 2024-04-25 ENCOUNTER — TELEPHONE (OUTPATIENT)
Dept: ENDOCRINOLOGY CLINIC | Facility: CLINIC | Age: 59
End: 2024-04-25

## 2024-04-25 ENCOUNTER — TELEMEDICINE (OUTPATIENT)
Dept: ENDOCRINOLOGY CLINIC | Facility: CLINIC | Age: 59
End: 2024-04-25
Payer: COMMERCIAL

## 2024-04-25 DIAGNOSIS — E11.65 TYPE 2 DIABETES MELLITUS WITH HYPERGLYCEMIA, WITH LONG-TERM CURRENT USE OF INSULIN (HCC): Primary | ICD-10-CM

## 2024-04-25 DIAGNOSIS — Z79.4 TYPE 2 DIABETES MELLITUS WITH HYPERGLYCEMIA, WITH LONG-TERM CURRENT USE OF INSULIN (HCC): Primary | ICD-10-CM

## 2024-04-25 DIAGNOSIS — E66.9 OBESITY (BMI 30-39.9): ICD-10-CM

## 2024-04-25 DIAGNOSIS — E78.5 DYSLIPIDEMIA: ICD-10-CM

## 2024-04-25 DIAGNOSIS — I10 PRIMARY HYPERTENSION: ICD-10-CM

## 2024-04-25 PROCEDURE — 99214 OFFICE O/P EST MOD 30 MIN: CPT | Performed by: NURSE PRACTITIONER

## 2024-04-25 PROCEDURE — 95251 CONT GLUC MNTR ANALYSIS I&R: CPT | Performed by: NURSE PRACTITIONER

## 2024-04-25 RX ORDER — CLOTRIMAZOLE AND BETAMETHASONE DIPROPIONATE 10; .64 MG/G; MG/G
1 CREAM TOPICAL 2 TIMES DAILY PRN
Qty: 60 G | Refills: 0 | Status: SHIPPED | OUTPATIENT
Start: 2024-04-25

## 2024-04-25 RX ORDER — GABAPENTIN 100 MG/1
100 CAPSULE ORAL 3 TIMES DAILY
Qty: 90 CAPSULE | Refills: 0 | Status: SHIPPED | OUTPATIENT
Start: 2024-04-25

## 2024-04-25 RX ORDER — INSULIN LISPRO 100 [IU]/ML
INJECTION, SOLUTION INTRAVENOUS; SUBCUTANEOUS
Qty: 15 ML | Refills: 0 | Status: SHIPPED | OUTPATIENT
Start: 2024-04-25

## 2024-04-25 RX ORDER — ERGOCALCIFEROL 1.25 MG/1
50000 CAPSULE ORAL WEEKLY
Qty: 12 CAPSULE | Refills: 0 | OUTPATIENT
Start: 2024-04-25 | End: 2024-07-12

## 2024-04-25 RX ORDER — INSULIN ASPART INJECTION 100 [IU]/ML
INJECTION, SOLUTION SUBCUTANEOUS
Qty: 15 ML | Refills: 0 | Status: SHIPPED | OUTPATIENT
Start: 2024-04-25 | End: 2024-04-25

## 2024-04-25 NOTE — TELEPHONE ENCOUNTER
Received fax from [facility] in [place] that pt's insurance does not cover fiasp, only humalog    Please advise

## 2024-04-25 NOTE — PATIENT INSTRUCTIONS
Trends are better on dexcom   Estimated A1C 7.8%     Have labs done after May 3, 2024  VIT D will be checked again to see if you still need weekly VIT D  If you do not I would recommend daily D3 vitamin 2000 units daily (over the counter) to avoid going low again     For diabetes  No more humalog pre mixed insulin     Start Fiasp : fast acting insulin only as needed     Check dexcom before meal or 2 hour after meals  If  Blood sugar is over 200 , you can dose Fiasp       If blood sugar is  200-240 , dose 2 units   241-280, dose 3 units  Over 281, dose 4 units       Start on Farxiga 10mg once daily     This medication will remove extra sugar out of blood into your urine.   This medication will not only help improve your blood sugars but have also shown to help protect the kidneys and heart.       It can be dosed anytime of day but morning is probably best time to take it due to increase in urination effect.     Please drink at least 4 glasses of water daily to avoid dehydration       It does not cause low blood sugars (hypoglycemia) .  Please call me if you develop any symptoms of urinary tract infection (burning with urination) or yeast infection (new rash itching or burning in the genital area)      If you are ever sick and not keeping fluids down, please hold the  Farxiga,  until you are tolerating fluids again     Also when taking these medications avoid following a  very-low-carbohydrate or ketogenic diet  as this  could increase the production of ketones, particularly during times of illness, dehydration, and/or metabolic stress, leading to diabetes ketoacidosis     Continue     Metformin 1000mg twice daily   Ozempic 0.5mg subcutaneous once weekly       American Diabetes Association: blood sugar targets:     Fasting blood sugar (before breakfast) Target:    (ideally less than 110)  2 hours after eating less than 180 (ideally less than  150 )     Call for blood sugars less than  75 or greater than  200  more than 2 times in a week     If you are wearing the sensor, please look at your trends/averages    Recommendations:   GMI (estimated A1C ) target under  7%     Time in range (healthy blood sugar targets) : goal is over 70%   less than 70 : goal is less than 4%   Over 180 : goal is less than 20 %   Over 250: goal is  less than 5%       Watch for low blood sugars: (less than 70 )    Treatment of Low Blood Glucose Action Plan  1. Check blood glucose to be sure that it is low. You cannot  always go by symptoms or how you feel. If in doubt, treat your low blood glucose anyway.  Rule of 15 :     2. Take 15 grams of carbohydrate (carb). Here are some choices:    4 oz. regular fruit juice  3-4 glucose tablets  6 oz. regular soda   7-8 jelly beans    3. Recheck blood glucose after 10-15 minutes. If blood glucose is still low (less than 70 mg/dl) repeat the treatment (step 2).    4. If your next meal is more than one hour away, eat a small snack.    5. If you’re not sure what caused your low blood glucose, call your healthcare provider.    6. Always check your blood glucose before you drive   Refill policies:     Refills may be delayed if you have not had a recent office visit or recent labs as requested by your prescriber.       Our goal is to process your refill within 3  business day of receiving the refill request.   Contact your pharmacy at least 5 days prior to running out of medication and have them send an electronic request or submit a refill through My chart: select  “request refill” option in your [employer] account.  Refills are not addressed after hours or on weekends; covering providers  do not authorize routine medications on weekends.  Refills  received after 4pm on Fridays will be addressed on Mondays.    If  your prescription is due for a refill, and you are due for a follow up appointment., this will  may impact the ability for you to get a 90 supply if requested.   Yearly blood tests are  required for many  medications to insure safe prescribing. If you are due for labs, you will have 30 days to complete the  requested labs before future refills are authorized.   In the event that your preferred pharmacy does not have the requested medication in stock (e.g. Backordered), it is your responsibility to find another pharmacy that has the requested medication available.  We will gladly send a new prescription to that pharmacy at your request.  To best provide you care, patients receiving routine medications need to be seen at least twice per year. However if the A1C is above 8% you will be need to be seen more frequently as recommended by provider and this will also impact your ability get obtain refills

## 2024-04-25 NOTE — TELEPHONE ENCOUNTER
Requesting    Name from pharmacy: GABAPENTIN 100 MG CAPSULE         Will file in chart as: GABAPENTIN 100 MG Oral Cap    Sig: TAKE 1 CAPSULE BY MOUTH TWICE A DAY FOR 3 DAYS, THEN INCREASE TO 1 CAPSULE THREE TIMES A DAY (EVERY 8 HOURS)    Disp: 90 capsule    Refills: 0 (Pharmacy requested: Not specified)    Start: 4/24/2024    Class: Normal    Non-formulary For: Neuropathy    Last ordered: 3 months ago (1/5/2024) by Abi oJse MD    Last refill: 1/5/2024    Rx #: [id number]    Neurology Medications [id number]/24/2024 07:08 PM   Protocol Details In person appointment or virtual visit in the past 6 mos or appointment in next 3 mos       Name from pharmacy: CLOTRIMAZOLE-BETAMETHASONE CRM         Will file in chart as: CLOTRIMAZOLE-BETAMETHASONE 1-0.05 % External Cream    Sig: APPLY 1 APPLICATION TOPICALLY TWICE A DAY AS NEEDED    Disp: 60 g    Refills: 0 (Pharmacy requested: Not specified)    Start: 4/24/2024    Class: Normal    Non-formulary For: Rash    Last ordered: 3 months ago (1/5/2024) by Abi Jose MD    Last refill: 1/5/2024    Rx #: [id number]        Name from pharmacy: VITAMIN D2 1.25MG(50,000 UNIT)         Will file in chart as: ERGOCALCIFEROL 1.25 MG (61042 UT) Oral Cap    Sig: Take 1 capsule (50,000 Units total) by mouth once a week for 12 doses.    Original sig: TAKE 1 CAPSULE (50,000 UNITS TOTAL) BY MOUTH ONCE A WEEK FOR 12 DOSES    Disp: 12 capsule    Refills: 0 (Pharmacy requested: Not specified)    Start: 4/24/2024    Class: Normal    Non-formulary For: Vitamin D deficiency    Last ordered: 2 months ago (2/6/2024) by Abi Jose MD    Last refill: 2/6/2024    Rx #: [id number]       To be filled at: [place] [address] [place] [address] [phone], [phone]     LOV: 02/06/24 w/ DV  RTC: 03/19/24  Last Relevant Labs: 02/01/24    Future Appointments   Date Time Provider Department Center   6/4/2024  3:00 PM Heidy Grover, [place] [facility] [place]

## 2024-04-25 NOTE — TELEPHONE ENCOUNTER
Will change   Orders Placed This Encounter    Insulin Lispro, 1 Unit Dial, (HUMALOG KWIKPEN) 100 UNIT/ML Subcutaneous Solution Pen-injector     Sig: Up to 15 units daily based on BG reading at meals     Dispense:  15 mL     Refill:  0

## 2024-04-25 NOTE — PROGRESS NOTES
Due to COVID-19 ACTION PLAN, the patient's office visit was converted to a video visit. Please note that the following visit was completed using two-way, real-time interactive audio and video communication.  Time Spent:  16  min    Marcy Moon is a 58 year old presenting for  type 2 diabetes management.   Primary care physician: Abi Jose MD  Most recent  A1C 9.8% ( last A1C  8.7% )   Daughter in on video visit w patient today   Still has not picked up farxiga -  PA was approved 4-4-2024   Daughter states going to get later today since pharmacy said rx is finally ready     Having rest of teeth extracted. Still eating soft carby foods  Has lost ~ 14 lb since ozempic started  No gi issues    pre mixed insulin was stopped but pt admits she will dose \" as needed \" when her trends are higher       Dexcom download: 4.8.2024 thru 4.21.2024     Coefficient of variation: 26.4   GMI: estimated A1C 7.8     Average glucose: 186 mg/dl (last upload 193mg/dl)     46% In target range(70-180mg/dl) ( last download: 49%)       46%High glucose targets (> 180mg/dl) : ( last download: 35%)       8%Very high glucose targets:  (> 250mg/dl) ( last download: 16%)       0%Low glucose targets:(less than 70mg/dl) ( last download: 0%)       <1%Very Low glucose targets: (< 54mg/dl ) : ( last download: 0%)       Findings:   Hypoglycemia: none     Time in target: 46 %  (ADA recommended target > 70%)        Diabetes History:  Type 2 DM ~ 2000     Patient has not had hospitalizations for blood sugar issues  denies any history of pancreatitis      Previous DM therapies:  NOVOLOG 70-30 ( insurance formulary )   Januvia 100 mg once daily --changed to GLP 3-2024     Current DM Regimen:  Metformin 1000mg twice daily   Farxiga 10mg once daily (not taking  )   Ozempic 0.5mg subcutaneous once weekly       Humalog pre mix: 75-25 flex pen: 24 units twice daily --> using prn       HGBA1C:    Lab Results   Component Value Date    A1C 9.8 (H)  02/01/2024    A1C 8.7 (A) 08/10/2023    A1C 10.2 (A) 12/05/2022     (H) 02/01/2024       Lab Results   Component Value Date    CHOLEST 204 (H) 02/01/2024    CHOLEST 154 02/27/2023    TRIG 206 (H) 02/01/2024    TRIG 212 (H) 02/27/2023    HDL 45 02/01/2024    HDL 44 02/27/2023     (H) 02/01/2024    LDL 75 02/27/2023     Lab Results   Component Value Date    MICROALBCREA 70.4 (H) 02/01/2024      Lab Results   Component Value Date    CREATSERUM 0.65 02/01/2024    CREATSERUM 0.67 07/26/2022    EGFRCR 103 02/01/2024     Lab Results   Component Value Date    AST 19 02/01/2024    AST 12 (L) 07/26/2022    ALT 23 02/01/2024    ALT 24 07/26/2022       Lab Results   Component Value Date    TSH 2.390 02/01/2024    TSH 3.120 07/26/2022           DM Complications:  Microvascular:   Neuropathy: yes  Retinopathy: no  Nephropathy: yes    Macrovascular:  PVD: no  CAD: no  Stroke/CVA: no        Modifying factors:  Medication adherence: yes   Recent steroids, illness or infections ( past 3m): no     Allergies: Patient has no known allergies.    Past Medical History:    Diabetes (HCC)    Diabetes mellitus (HCC)    Essential hypertension     No past surgical history on file.  Social History     Socioeconomic History    Marital status:    Tobacco Use    Smoking status: Never    Smokeless tobacco: Never   Vaping Use    Vaping status: Never Used   Substance and Sexual Activity    Alcohol use: Not Currently    Drug use: Never     Social Determinants of Health     Financial Resource Strain: Not on File (10/5/2022)    [facility] from [facility]     Financial Resource Strain     Financial Resource Strain: 0   Food Insecurity: Not on File (10/5/2022)    Received from [facility]     Food Insecurity     Food: 0   Transportation Needs: Not on File (10/5/2022)    Received from [facility]     Transportation Needs     Transportation: 0   Physical Activity: Not on File (10/5/2022)    Received from [facility]     Physical  Activity     Physical Activity: 0   Stress: Not on File (10/5/2022)    Received from [facility]     Stress     Stress: 0   Social Connections: Not on File (10/5/2022)    Received from [facility]     Social Connections     Social Connections and Isolation: 0   Housing Stability: Not on File (10/5/2022)    Received from [facility]     Housing Stability     Housin     Family History   Problem Relation Age of Onset    Diabetes Father     Diabetes Maternal Grandmother     Diabetes Sister     Diabetes Brother      Current Medication List:   Current Outpatient Medications   Medication Sig Dispense Refill    Insulin Aspart, w/Niacinamide, (FIASP FLEXTOUCH) 100 UNIT/ML Subcutaneous Solution Pen-injector Up to 15 units daily as directed in divided doses based on BG reading 15 mL 0    dapagliflozin (FARXIGA) 10 MG Oral Tab Take 1 tablet (10 mg total) by mouth daily. 90 tablet 0    semaglutide (OZEMPIC, 0.25 OR 0.5 MG/DOSE,) 2 MG/3ML Subcutaneous Solution Pen-injector Inject 0.5 mg into the skin once a week. 9 mL 0    Continuous Blood Gluc Sensor (DEXCOM G7 SENSOR) Does not apply Misc 1 each Every 10 days. 3 each 3    atorvastatin 40 MG Oral Tab Take 1 tablet (40 mg total) by mouth nightly. 90 tablet 3    lisinopril 40 MG Oral Tab Take 1 tablet (40 mg total) by mouth daily. 1x daily 90 tablet 3    amLODIPine 5 MG Oral Tab Take 1 tablet (5 mg total) by mouth daily. 90 tablet 3    metFORMIN HCl 1000 MG Oral Tab Take 1 tablet (1,000 mg total) by mouth 2 (two) times daily with meals. 180 tablet 1    GABAPENTIN 100 MG Oral Cap TAKE 1 CAPSULE BY MOUTH TWICE A DAY FOR 3 DAYS, THEN INCREASE TO 1 CAPSULE THREE TIMES A DAY (EVERY 8 HOURS) 90 capsule 0    CLOTRIMAZOLE-BETAMETHASONE 1-0.05 % External Cream APPLY 1 APPLICATION TOPICALLY TWICE A DAY AS NEEDED 60 g 0    Insulin Pen Needle 33G X 8 MM Does not apply Misc 1 Device 2 (two) times a day. To use with 70/30 insulin BID with meals 180 each 3           DM associated review  of  symptoms:   Endocrine: Polyuria, polyphagia, polydipsia: no  Neurological: Paresthesias: yes LE   HEENT: Blurred vision: no  Skin: no rash or wounds  Hematological: Hypoglycemia: no      Review of Systems     LUNGS: denies shortness of breath   CARDIOVASCULAR: denies chest pain  GI: denies abdominal pain, nausea or diarrhea   : denies dysuria  MUSCULOSKELETAL: denies pain        Physical exam:  There were no vitals taken for this visit.  There is no height or weight on file to calculate BMI.    Physical Exam     Constitutional: Normal appearance   Cardiovascular: Not assessed today   Pulmonary/Chest: Effort normal  Neurological: Alert and oriented .   Psychiatric: Normal mood and affect.        Assessment/Plan:    Hypertension  Needs ongoing monitoring   CPM       Nephropathy   Continue  ace rx , Farxiga   Encouraged improved glycemic targets to help kidneys long term health     Dyslipidemia:   Last  labs done 2-2024   Update 5-2024   Continue  Atorvastatin rx      Obesity  > 10 lb weight loss since starting GLP1 rx  Continue GLP --> see DM note         Type 2 diabetes mellitus with hyperglycemia, with long-term current use of insulin ([facility])  A1C: 9.8% (update due 5-2024)  GMI 7.8% on Dexcom   Self reported weight: 168 lb  ( last diabetes center weight: 180 lb )  Diabetes control is improving but still sub optimal     Start Farxiga 10mg once daily   ~No recent or recurrent UTI or yeast infection   Again, discussed the risk of and benefits of SGLT2 inhibitor class for treatment of type 2 Diabetes. This class of med results in the renal excretion of glucose and can lower blood sugars.     Discussed side effects including UTI and fungal infections. To Call if any rash, itching in genital area or painful urination develops  Discussed the importance of hydration. Reviewed Sick day management and avoidance of keto diets   Stop  Humalog 75-25 insulin     Start fiasp sliding scale as needed    If blood sugar  is  200-240 , dose 2 units   241-280, dose 3 units  Over 281, dose 4 units      Continue     Metformin 1000mg twice daily   Dexcom G7 (pharmacy)   Ozempic 0.5mg subcutaneous once weekly   ~due to dental work and change in diet, will hold off on increasing ozempic at this time        Reviewed , clinical significance of A1c, adverse effects of suboptimal glucose control, and goals of therapy   Reviewed the A1C test, what the value reflects and the goal for the patient.   Reminded pt on A1C and blood sugar targets (Fasting < 130 and post prandial <180 ) and complications associated with hyperglycemia and uncontrolled DM (on AVS)   Recommended SMBG 2- x daily if not using CGM   Reviewed s/s and treatment of hypoglycemia (on AVS)   Reminded to continue with lifestyle modifications since they have positive impact on diabetes/blood sugars/health (portion control, physical activity, weight loss)   Reinforced timing and adherence with medication, self-monitoring of blood glucose and routine follow up    Recommended for  patient to follow up in Diabetes center to review carb goals, food label reading, and offer further support/guidance with exercise planning and weight loss.     The patient is asked to return in 2 m in office  but recommended to contact DM clinic sooner if questions or concerns.    The patient indicates understanding of these issues and agrees to the plan.      Orders Placed This Encounter    Insulin Aspart, w/Niacinamide, (FIASP FLEXTOUCH) 100 UNIT/ML Subcutaneous Solution Pen-injector     Sig: Up to 15 units daily as directed in divided doses based on BG reading     Dispense:  15 mL     Refill:  0     DM quality  A1C/Blood pressure: as reported above   Nephropathy screenin  continue ace /arb rx.   LIPID screenin  . atorva statin rx.   Last dilated eye exam: No data recorded Exam shows retinopathy? No data recorded  Last diabetic foot exam: Last Foot Exam: 08/10/23    The risks and benefits  of my recommendations, as well as other treatment options were discussed with the patient today. questions were also answered to the best of my knowledge.       This has been done in good sabrina to provide continuity of care in the best interest of the provider-patient relationship, due to the on-going public health crisis/national emergency and because of restrictions of visitation.  There are limitations of this visit as no or only very limited physical exam could be performed.  Every conscious effort was taken to allow for sufficient and adequate time.  This billing visit was spent on reviewing blood sugar trends, DM related labs, medications and decision making.  Appropriate medical decision-making and tests are ordered as detailed in the plan of care above.      Marcy Moon understands phone or video evaluation is not a substitute for face-to-face examination or emergency care. Patient advised to go to ER or call 911 for worsening symptoms or acute distress.     Heidy Grover, APRN

## 2024-05-09 ENCOUNTER — TELEPHONE (OUTPATIENT)
Dept: ENDOCRINOLOGY CLINIC | Facility: CLINIC | Age: 59
End: 2024-05-09

## 2024-05-09 DIAGNOSIS — E11.29 TYPE 2 DIABETES MELLITUS WITH MICROALBUMINURIA (HCC): ICD-10-CM

## 2024-05-09 DIAGNOSIS — R80.9 TYPE 2 DIABETES MELLITUS WITH MICROALBUMINURIA (HCC): ICD-10-CM

## 2024-05-09 NOTE — TELEPHONE ENCOUNTER
Requesting    Name from pharmacy: HUMALOG MIX 75-25 KWIKPEN         Will file in chart as: HUMALOG MIX 75/25 KWIKPEN (75-25) 100 UNIT/ML Subcutaneous Suspension Pen-injector    The original prescription was discontinued on 4/25/2024 by Heidy Grover APRN for the following reason: Alternate therapy. Renewing this prescription may not be appropriate.    Sig: INJECT 26 UNITS INTO THE SKIN 2 (TWO) TIMES DAILY WITH MEALS. NEED APPOINTMENT FOR FURTHER MEDS    Disp: Not specified (Pharmacy requested: 15 Undefined)    Refills: 0 (Pharmacy requested: Not specified)    Start: 5/9/2024    Class: Normal    Non-formulary For: Type 2 diabetes mellitus with microalbuminuria (HCC)    To pharmacy: DX Code Needed  .    Last ordered: 3 months ago (2/6/2024) by Abi Jose MD    Last refill: 2/7/2024    Rx #: [id number]    Diabetes Medication Protocol [id number]/09/2024 02:53 PM   Protocol Details Last A1C < 7.5 and within past 6 months    In person appointment or virtual visit in the past 6 mos or appointment in next 3 mos    Microalbumin procedure in past 12 months or taking ACE/ARB    EGFRCR or GFRNAA > 50    GFR in the past 12 months      To be filled at: [facility] [address] [address] [phone], [phone]     LOV: 02/06/24 w/ DV  RTC: 03/19/24  Last Relevant Labs: 02/01/24  Filled: 04/25/24 #15mL with 0 refills    Future Appointments   Date Time Provider Department Center   6/4/2024  3:00 PM Heidy Grover APRN [facility]

## 2024-05-09 NOTE — TELEPHONE ENCOUNTER
Daughter called in, left message needing to clarify humalog. Returned call and we reviewed medications, sliding scale and common side effects of her new medications.  They will contact office with further questions or concerns.

## 2024-05-10 RX ORDER — INSULIN LISPRO 100 [IU]/ML
INJECTION, SUSPENSION SUBCUTANEOUS
Refills: 0 | OUTPATIENT
Start: 2024-05-10

## 2024-05-28 RX ORDER — SEMAGLUTIDE 0.68 MG/ML
0.5 INJECTION, SOLUTION SUBCUTANEOUS WEEKLY
Qty: 9 ML | Refills: 0 | Status: SHIPPED | OUTPATIENT
Start: 2024-05-28

## 2024-05-28 NOTE — TELEPHONE ENCOUNTER
Requested Prescriptions     Pending Prescriptions Disp Refills    OZEMPIC, 0.25 OR 0.5 MG/DOSE, 2 MG/3ML Subcutaneous Solution Pen-injector [Pharmacy Med Name: OZEMPIC 0.25-0.5 MG/DOSE PEN]  0     Sig: INJECT 0.5 MG INTO THE SKIN ONCE A WEEK     Your appointments       Date & Time Appointment Department ([place])    Jun 04, 2024 3:00 PM CDT Apn/Md Diabetic Follow Up with Heidy Grover APRN [facility]              [facility]  [address]  [facility] [place] [address] [phone]          Last A1c value was 9.8% done 2/1/2024.    Refill 3/7/24  LOV 4/25/24

## 2024-06-04 ENCOUNTER — OFFICE VISIT (OUTPATIENT)
Dept: ENDOCRINOLOGY CLINIC | Facility: CLINIC | Age: 59
End: 2024-06-04
Payer: COMMERCIAL

## 2024-06-04 ENCOUNTER — LAB ENCOUNTER (OUTPATIENT)
Dept: LAB | Age: 59
End: 2024-06-04
Attending: INTERNAL MEDICINE
Payer: COMMERCIAL

## 2024-06-04 ENCOUNTER — NURSE ONLY (OUTPATIENT)
Dept: INTERNAL MEDICINE CLINIC | Facility: CLINIC | Age: 59
End: 2024-06-04
Payer: COMMERCIAL

## 2024-06-04 VITALS
SYSTOLIC BLOOD PRESSURE: 124 MMHG | BODY MASS INDEX: 29 KG/M2 | HEART RATE: 68 BPM | OXYGEN SATURATION: 95 % | DIASTOLIC BLOOD PRESSURE: 70 MMHG | WEIGHT: 160.19 LBS | RESPIRATION RATE: 16 BRPM

## 2024-06-04 DIAGNOSIS — E78.2 MIXED HYPERLIPIDEMIA: ICD-10-CM

## 2024-06-04 DIAGNOSIS — Z79.4 TYPE 2 DIABETES MELLITUS WITH HYPERGLYCEMIA, WITH LONG-TERM CURRENT USE OF INSULIN (HCC): Primary | ICD-10-CM

## 2024-06-04 DIAGNOSIS — R80.9 TYPE 2 DIABETES MELLITUS WITH MICROALBUMINURIA (HCC): ICD-10-CM

## 2024-06-04 DIAGNOSIS — I10 PRIMARY HYPERTENSION: ICD-10-CM

## 2024-06-04 DIAGNOSIS — E11.65 TYPE 2 DIABETES MELLITUS WITH HYPERGLYCEMIA, WITH LONG-TERM CURRENT USE OF INSULIN (HCC): ICD-10-CM

## 2024-06-04 DIAGNOSIS — Z79.4 TYPE 2 DIABETES MELLITUS WITH HYPERGLYCEMIA, WITH LONG-TERM CURRENT USE OF INSULIN (HCC): ICD-10-CM

## 2024-06-04 DIAGNOSIS — E11.29 TYPE 2 DIABETES MELLITUS WITH MICROALBUMINURIA (HCC): ICD-10-CM

## 2024-06-04 DIAGNOSIS — E55.9 VITAMIN D DEFICIENCY: ICD-10-CM

## 2024-06-04 DIAGNOSIS — E78.5 HYPERLIPIDEMIA, UNSPECIFIED HYPERLIPIDEMIA TYPE: Primary | ICD-10-CM

## 2024-06-04 DIAGNOSIS — E11.65 TYPE 2 DIABETES MELLITUS WITH HYPERGLYCEMIA, WITH LONG-TERM CURRENT USE OF INSULIN (HCC): Primary | ICD-10-CM

## 2024-06-04 DIAGNOSIS — E78.5 HYPERLIPIDEMIA, UNSPECIFIED HYPERLIPIDEMIA TYPE: ICD-10-CM

## 2024-06-04 LAB
ALBUMIN SERPL-MCNC: 4.5 G/DL (ref 3.2–4.8)
ALBUMIN/GLOB SERPL: 1.5 {RATIO} (ref 1–2)
ALP LIVER SERPL-CCNC: 78 U/L
ALT SERPL-CCNC: 14 U/L
ANION GAP SERPL CALC-SCNC: 8 MMOL/L (ref 0–18)
AST SERPL-CCNC: 16 U/L (ref ?–34)
BILIRUB SERPL-MCNC: 0.3 MG/DL (ref 0.3–1.2)
BUN BLD-MCNC: 11 MG/DL (ref 9–23)
BUN/CREAT SERPL: 17.5 (ref 10–20)
CALCIUM BLD-MCNC: 9.8 MG/DL (ref 8.7–10.4)
CHLORIDE SERPL-SCNC: 106 MMOL/L (ref 98–112)
CHOLEST SERPL-MCNC: 185 MG/DL (ref ?–200)
CO2 SERPL-SCNC: 26 MMOL/L (ref 21–32)
CREAT BLD-MCNC: 0.63 MG/DL
EGFRCR SERPLBLD CKD-EPI 2021: 104 ML/MIN/1.73M2 (ref 60–?)
EST. AVERAGE GLUCOSE BLD GHB EST-MCNC: 186 MG/DL (ref 68–126)
FASTING PATIENT LIPID ANSWER: YES
FASTING STATUS PATIENT QL REPORTED: YES
GLOBULIN PLAS-MCNC: 3.1 G/DL (ref 2–3.5)
GLUCOSE BLD-MCNC: 116 MG/DL (ref 70–99)
GLUCOSE BLOOD: 285
HBA1C MFR BLD: 8.1 % (ref ?–5.7)
HDLC SERPL-MCNC: 47 MG/DL (ref 40–59)
LDLC SERPL CALC-MCNC: 103 MG/DL (ref ?–100)
NONHDLC SERPL-MCNC: 138 MG/DL (ref ?–130)
OSMOLALITY SERPL CALC.SUM OF ELEC: 290 MOSM/KG (ref 275–295)
POTASSIUM SERPL-SCNC: 4.4 MMOL/L (ref 3.5–5.1)
PROT SERPL-MCNC: 7.6 G/DL (ref 5.7–8.2)
SODIUM SERPL-SCNC: 140 MMOL/L (ref 136–145)
TEST STRIP LOT #: NORMAL NUMERIC
TRIGL SERPL-MCNC: 201 MG/DL (ref 30–149)
VIT D+METAB SERPL-MCNC: 45.2 NG/ML (ref 30–100)
VLDLC SERPL CALC-MCNC: 34 MG/DL (ref 0–30)

## 2024-06-04 PROCEDURE — 3074F SYST BP LT 130 MM HG: CPT | Performed by: NURSE PRACTITIONER

## 2024-06-04 PROCEDURE — 83036 HEMOGLOBIN GLYCOSYLATED A1C: CPT | Performed by: INTERNAL MEDICINE

## 2024-06-04 PROCEDURE — 80053 COMPREHEN METABOLIC PANEL: CPT | Performed by: INTERNAL MEDICINE

## 2024-06-04 PROCEDURE — 82947 ASSAY GLUCOSE BLOOD QUANT: CPT | Performed by: NURSE PRACTITIONER

## 2024-06-04 PROCEDURE — 99215 OFFICE O/P EST HI 40 MIN: CPT | Performed by: NURSE PRACTITIONER

## 2024-06-04 PROCEDURE — 82306 VITAMIN D 25 HYDROXY: CPT | Performed by: INTERNAL MEDICINE

## 2024-06-04 PROCEDURE — 92229 IMG RTA DETC/MNTR DS POC ALY: CPT | Performed by: NURSE PRACTITIONER

## 2024-06-04 PROCEDURE — 3078F DIAST BP <80 MM HG: CPT | Performed by: NURSE PRACTITIONER

## 2024-06-04 PROCEDURE — 2026F EYE IMG VALID EVC RTNOPTHY: CPT | Performed by: NURSE PRACTITIONER

## 2024-06-04 PROCEDURE — 80061 LIPID PANEL: CPT | Performed by: INTERNAL MEDICINE

## 2024-06-04 RX ORDER — ATORVASTATIN CALCIUM 80 MG/1
80 TABLET, FILM COATED ORAL NIGHTLY
Qty: 90 TABLET | Refills: 3 | Status: SHIPPED | OUTPATIENT
Start: 2024-06-04

## 2024-06-04 RX ORDER — ACYCLOVIR 400 MG/1
1 TABLET ORAL
Qty: 9 EACH | Refills: 1 | Status: SHIPPED | OUTPATIENT
Start: 2024-06-04

## 2024-06-04 NOTE — PROGRESS NOTES
Chief Complaint   Patient presents with    Diabetes     Follow up forgot meter BG in office not fasting 285          Marcy Moon is a 58 year old presenting for  type 2 diabetes management.   Primary care physician: Abi Jose MD  Last DM appt 4-2024   Congolese speaking; declined language line today; daughter present today     Had labs this am   Reviewed CMP, lipids     Most recent   A1C 8.1%  ( last A1C  9.8% )   BG today is 285mg/dl - 1 hr ago had mole'  Fasting glucose at lab this am  : 116 mg/dl     Has only needed 1 injection of humalog past month   Tolerating ozempic and farxiga well  Has lost ~ 22 lb since starting w DM center and stopping insulin therapy     Has not been wearing sensor for over 30 days; got a new phone and never picked up refills  Admits not routinely testing bg readings        Diabetes History:  Type 2 DM ~ 2000     Patient has not had hospitalizations for blood sugar issues  denies any history of pancreatitis      Previous DM therapies:  NOVOLOG 70-30 ( insurance formulary )   Januvia 100 mg once daily --changed to GLP 3-2024   75-25 insulin; improved trends 4-204       Current DM Regimen:  Metformin 1000mg twice daily   Farxiga 10mg once daily   Ozempic 0.5mg subcutaneous once weekly   Humalog kwik pen: at meals per sliding scale;     if blood sugar is  200-240 , dose 2 units   241-280, dose 3 units  Over 281, dose 4 units            HGBA1C:    Lab Results   Component Value Date    A1C 9.8 (H) 02/01/2024    A1C 8.7 (A) 08/10/2023    A1C 10.2 (A) 12/05/2022     (H) 02/01/2024       Lab Results   Component Value Date    CHOLEST 185 06/04/2024    CHOLEST 204 (H) 02/01/2024    TRIG 201 (H) 06/04/2024    TRIG 206 (H) 02/01/2024    HDL 47 06/04/2024    HDL 45 02/01/2024     (H) 06/04/2024     (H) 02/01/2024     Lab Results   Component Value Date    MICROALBCREA 70.4 (H) 02/01/2024      Lab Results   Component Value Date    CREATSERUM 0.63 06/04/2024    CREATSERUM  0.65 2024    EGFRCR 104 2024    EGFRCR 103 2024     Lab Results   Component Value Date    AST 16 2024    AST 19 2024    ALT 14 2024    ALT 23 2024       Lab Results   Component Value Date    TSH 2.390 2024    TSH 3.120 2022           DM Complications:  Microvascular:   Neuropathy: yes  Retinopathy: no  Nephropathy: yes    Macrovascular:  PVD: no  CAD: no  Stroke/CVA: no        Modifying factors:  Medication adherence: yes   Recent steroids, illness or infections ( past 3m): no     Allergies: Patient has no known allergies.    Past Medical History:    Diabetes (HCC)    Diabetes mellitus (HCC)    Essential hypertension     History reviewed. No pertinent surgical history.  Social History     Socioeconomic History    Marital status:    Tobacco Use    Smoking status: Never    Smokeless tobacco: Never   Vaping Use    Vaping status: Never Used   Substance and Sexual Activity    Alcohol use: Not Currently    Drug use: Never     Social Determinants of Health     Financial Resource Strain: Not on File (10/5/2022)    [facility] from [facility]     Financial Resource Strain     Financial Resource Strain: 0   Food Insecurity: Not on File (10/5/2022)    Received from [facility]     Food Insecurity     Food: 0   Transportation Needs: Not on File (10/5/2022)    Received from [facility]     Transportation Needs     Transportation: 0   Physical Activity: Not on File (10/5/2022)    Received from [facility]     Physical Activity     Physical Activity: 0   Stress: Not on File (10/5/2022)    Received from [facility]     Stress     Stress: 0   Social Connections: Not on File (10/5/2022)    Received from [facility]     Social Connections     Social Connections and Isolation: 0   Housing Stability: Not on File (10/5/2022)    Received from [facility]     Housing Stability     Housin     Family History   Problem Relation Age of Onset    Diabetes Father      Diabetes Maternal Grandmother     Diabetes Sister     Diabetes Brother      Current Medication List:   Current Outpatient Medications   Medication Sig Dispense Refill    Continuous Glucose Sensor (DEXCOM G7 SENSOR) Does not apply Misc 1 each Every 10 days. 9 each 1    metFORMIN HCl 1000 MG Oral Tab Take 1 tablet (1,000 mg total) by mouth 2 (two) times daily with meals. 180 tablet 1    semaglutide (OZEMPIC, 0.25 OR 0.5 MG/DOSE,) 2 MG/3ML Subcutaneous Solution Pen-injector INJECT 0.5 MG INTO THE SKIN ONCE A WEEK 9 mL 0    gabapentin 100 MG Oral Cap Take 1 capsule (100 mg total) by mouth 3 (three) times daily. 90 capsule 0    Insulin Lispro, 1 Unit Dial, (HUMALOG KWIKPEN) 100 UNIT/ML Subcutaneous Solution Pen-injector Up to 15 units daily based on BG reading at meals 15 mL 0    dapagliflozin (FARXIGA) 10 MG Oral Tab Take 1 tablet (10 mg total) by mouth daily. 90 tablet 0    atorvastatin 40 MG Oral Tab Take 1 tablet (40 mg total) by mouth nightly. 90 tablet 3    lisinopril 40 MG Oral Tab Take 1 tablet (40 mg total) by mouth daily. 1x daily 90 tablet 3    amLODIPine 5 MG Oral Tab Take 1 tablet (5 mg total) by mouth daily. 90 tablet 3    clotrimazole-betamethasone 1-0.05 % External Cream Apply 1 Application topically 2 (two) times daily as needed. 60 g 0           DM associated review of  symptoms:   Endocrine: Polyuria, polyphagia, polydipsia: no  Neurological: Paresthesias: yes LE   HEENT: Blurred vision: no  Skin: no rash or wounds  Hematological: Hypoglycemia: no      Review of Systems     LUNGS: denies shortness of breath   CARDIOVASCULAR: denies chest pain  GI: denies abdominal pain, nausea or diarrhea   : denies dysuria          Physical exam:  /70   Pulse 68   Resp 16   Wt 160 lb 3.2 oz (72.7 kg)   SpO2 95%   BMI 29.30 kg/m²   Body mass index is 29.3 kg/m².    Physical Exam     Constitutional: Normal appearance   Cardiovascular: Normal rate   Pulmonary/Chest: Effort normal  Neurological: Alert and  oriented .   Psychiatric: Normal mood and affect.   Musculoskeletal:  Diabetes foot exam:   Good foot hygiene.   Bilateral barefoot skin diabetic exam: my cotic nail on B great toe- otherwise normal   Visualized feet and the appearance : normal.  Bilateral monofilament/sensation of both feet is normal. Vibration to dorsum to the first toe perceived.   Bilateral 2+ pedal pulse pedal pulse exam          Assessment/Plan:    Hypertension  Needs ongoing monitoring   CPM       Nephropathy   Continue  ace rx , Farxiga   Encouraged improved glycemic targets to help kidneys long term health     Dyslipidemia:   Last  labs done 5-2024   Only slight LDL improvement; consider statin change   Continue  Atorvastatin rx      Obesity  > 10 lb weight loss since starting GLP1 rx  Continue GLP --> see DM note         Type 2 diabetes mellitus with hyperglycemia, with long-term current use of insulin ([facility])  A1C: 8.1% ( last A1C 9.8%)  Weight 160 lb ( last  diabetes center weight: 180 lb )  Diabetes control is improving but still sub optimal     Ideally would be nice for pt to stay on CGM consistently to better track her trends    If not wearing CGM, reminded to test BG at least 2 x daily     For now continue    Ozempic 0.5mg subcutaneous once weekly   Metformin 1000mg twice daily     Farxiga 10mg once daily      Reminded to call if any rash, itching in genital area or painful urination develops  Discussed the importance of hydration. Reviewed Sick day management and avoidance of keto diets     Humalog kwik pen: sliding scale as needed    If blood sugar is  200-240 , dose 2 units   241-280, dose 3 units  Over 281, dose 4 units            Reviewed , clinical significance of A1c, adverse effects of suboptimal glucose control, and goals of therapy   Reviewed the A1C test, what the value reflects and the goal for the patient.   Reminded pt on A1C and blood sugar targets (Fasting < 130 and post prandial <180 ) and complications associated with  hyperglycemia and uncontrolled DM (on AVS)   Recommended SMBG 2- x daily if not using CGM   Reviewed s/s and treatment of hypoglycemia (on AVS)   Reminded to continue with lifestyle modifications since they have positive impact on diabetes/blood sugars/health (portion control, physical activity, weight loss)   Reinforced timing and adherence with medication, self-monitoring of blood glucose and routine follow up  Check dexcom in 3 weeks     The patient is asked to return in 3 m in office  but recommended to contact DM clinic sooner if questions or concerns.    The patient indicates understanding of these issues and agrees to the plan.      Orders Placed This Encounter    ASSAY QUANTITATIVE, GLUCOSE     Order Specific Question:   Release to patient     Answer:   Immediate    Continuous Glucose Sensor (DEXCOM G7 SENSOR) Does not apply Misc     Si each Every 10 days.     Dispense:  9 each     Refill:  1    metFORMIN HCl 1000 MG Oral Tab     Sig: Take 1 tablet (1,000 mg total) by mouth 2 (two) times daily with meals.     Dispense:  180 tablet     Refill:  1     DM quality  A1C/Blood pressure: as reported above   Nephropathy screenin  continue ace /arb rx.   LIPID screenin  . atorva statin rx.   Last dilated eye exam: No data recorded Exam shows retinopathy? No data recorded  Last diabetic foot exam: Last Foot Exam: 08/10/23        Spent 50 min obtaining patient history, evaluating patient, reviewing blood glucose trends, discussing treatment options, lifestyle modifications and completing documentation -   The risks and benefits of my recommendations, as well as other treatment options were discussed with the patient today. questions were also answered to the best of my knowledge.

## 2024-06-04 NOTE — PATIENT INSTRUCTIONS
Continuaremos trabajando juntos para mantener jordan niveles de azúcar en benjie en un objetivo saludable.      El objetivo principal del tratamiento de la diabetes es evitar que mahmood nivel de azúcar suba demasiado. Medimos jordan tendencias generales de azúcar en benjie con estella prueba de hemoglobina A1C. (también llamado A1C) Para la mayoría de las personas, el objetivo es menos del 7.0%, sony a veces hacemos excepciones basadas en la edad, el historial de dafne y otros factores.  Mantener un A1C por debajo del 7% ayuda a prevenir problemas de dafne relacionados con la diabetes.  Si mahmood A1C aumenta demasiado, entonces debemos hablar sobre cambiar mahmood tratamiento actual para la diabetes.  MEDICAMENTOS:    Lo mejor sería que usaras el sensor Dexcom para controlar mejor tus niveles de azúcar en benjie.     Si no usa el sensor, mida el nivel de azúcar en benjie al menos 2 o 3 veces al día.   antes del desayuno, antes de la freddy y antes de acostarse si lo recuerdas       Continuar     Metformina 1000 mg dos veces al día     Ozempic 0,5 mg subcutáneo estella vez a la semana     Farxiga 10 mg estella vez al día     [place] Humalog kwik: escala móvil según sea necesario, cuando el nivel de azúcar en la benjie es superior a 180   ya sea antes de la comida o 2 horas después de la comida     No administre humalog AMBOS antes y después, ya que esto aumentará el riesgo de niveles bajos de azúcar en la benjie.         Si el nivel de azúcar en la benjie es  180-220, dosis 2 unidades HUMALOG  221-260, dosis 3 unidades HUMALOG    si es mayor de 261, dosis 4 unidades de HUMALOG      Es importante grant todos jordan medicamentos según lo prescrito.  Además, llámeme si tiene algún problema con preguntas sobre medicamentos, efectos secundarios, preguntas sobre la dosificación o problemas con las tendencias de azúcar en benjie ANTES DE CAMBIAR O DETENER CUALQUIER MEDICAMENTO.    Prueba de azúcar en benjie:  Recuerde llevar mahmood medidor de glucosa o registro  de azúcar en benjie a cada alicia en el centro de diabetes.  [place] me permite realizar ajustes de forma keith en mahmood plan de diabetes.  Para poder determinar cualquier patrón en mahmood nivel de azúcar en benjie, necesitará medir mahmood nivel de azúcar en benjie 2-3  veces al día  Momentos recomendados para realizar la prueba: antes del desayuno (en ayunas) y luego alternar la prueba de azúcar en benjie 2 horas después de las comidas.    Objetivos de azúcar en benjie:  Antes del desayuno:  (preferiblemente menos de 110)  2 horas Después de las comidas: menos de 180 (preferiblemente menos de 150)  Solicite niveles de azúcar en benjie persistentes inferiores a 75 o superiores a 200.  Los niveles de azúcar en benjie superiores a 200 no son aceptables para alcanzar mahmood objetivo de mejorar la diabetes  Esté atento a los niveles bajos de azúcar en benjie: (menos de 70)      Síntomas de niveles bajos de azúcar en benjie:  Temblores o mareos  Piel fría y húmeda o sudorosa  Tener hambre  Dolor de rafa  Nerviosismo  Un latido maria dolores y rápido  Debilidad  Confusión o irritabilidad  [place] borrosa  Tener pesadillas o despertarse confundido o sudando  Entumecimiento u hormigueo en los labios o la lengua    Plan de acción para el tratamiento de la hipoglucemia  1. Revise la glucosa en benjie para asegurarse de que esté baja. No siempre puedes seguir los síntomas. En anuj de nadya, trate mahmood nivel bajo de glucosa en benjie de todos modos.  2. [place] 15 gramos de carbohidratos (carbohidratos). Aquí hay algunas opciones:  4 onzas. jugo de fruta regular  3-4 tabletas de glucosa  6 onzas. refresco regular  7-8 gominolas  3. Vuelva a controlar la glucosa en benjie después de 10 a 15 minutos. Si la glucosa en benjie sigue siendo baja (menos de 70 mg / dl) repita el tratamiento (paso 2).  4. Si falta más de estella hora para mahmood próxima comida, coma un bocadillo pequeño.  5. Si no está seguro de la causa de mahmood nivel bajo de glucosa en benjie, llame a mahmood  proveedor de atención médica.  6. Siempre controle mahmood glucosa en benjie antes de conducir          Nadeem  Heidy Grover  [phone]

## 2024-07-30 ENCOUNTER — TELEPHONE (OUTPATIENT)
Dept: ENDOCRINOLOGY CLINIC | Facility: CLINIC | Age: 59
End: 2024-07-30

## 2024-07-30 DIAGNOSIS — G62.9 NEUROPATHY: ICD-10-CM

## 2024-07-30 NOTE — TELEPHONE ENCOUNTER
CB wanted to check on pt to see if they are still using Dexcom sensor contacted pt and spoke to them states they had the sensor one 2x since but has not placed the third one. Pt states it is easier for them to just check their BG they really only check 1x a day did inform pt they need to check 2x daily at least pt states sensor cost them $75 for a month supply did seem a little out of price range for them. Pt states when daughter is out of work they will have them place sensor back on but mentioned BG in the morning is around  doesn't check after meals.        [phone]

## 2024-07-31 RX ORDER — GABAPENTIN 100 MG/1
100 CAPSULE ORAL 3 TIMES DAILY
Qty: 90 CAPSULE | Refills: 1 | Status: SHIPPED | OUTPATIENT
Start: 2024-07-31

## 2024-07-31 NOTE — TELEPHONE ENCOUNTER
Requesting    Name from pharmacy: GABAPENTIN 100 MG CAPSULE         Will file in chart as: GABAPENTIN 100 MG Oral Cap    Sig: TAKE 1 CAPSULE (100 MG TOTAL) BY MOUTH THREE TIMES DAILY.    Disp: 90 capsule    Refills: 0 (Pharmacy requested: Not specified)    Start: 7/30/2024    Class: Normal    Non-formulary For: Neuropathy    Last ordered: 3 months ago (4/25/2024) by Abi Jose MD    Last refill: 4/25/2024    Rx #: [id number]    Neurology Medications Olvjoy6507/30/2024 12:50 PM   Protocol Details In person appointment or virtual visit in the past 6 mos or appointment in next 3 mos      To be filled at: [facility] [address] [address] [phone], [phone]        LOV: 02/06/24 w/ DVF  RTC: 03/19/24  Last Relevant Labs: 06/04/24  Filled: 04/25/24 #90 with 0 refills    Future Appointments   Date Time Provider Department Center   10/8/2024 10:00 AM Heidy Grover APRN [id number]

## 2024-08-26 RX ORDER — SEMAGLUTIDE 0.68 MG/ML
0.5 INJECTION, SOLUTION SUBCUTANEOUS WEEKLY
Qty: 9 ML | Refills: 0 | Status: SHIPPED | OUTPATIENT
Start: 2024-08-26

## 2024-08-26 NOTE — TELEPHONE ENCOUNTER
Requested Prescriptions     Pending Prescriptions Disp Refills    OZEMPIC, 0.25 OR 0.5 MG/DOSE, 2 MG/3ML Subcutaneous Solution Pen-injector [Pharmacy Med Name: OZEMPIC 0.25-0.5 MG/DOSE PEN]  0     Sig: INJECT 0.5 MG INTO THE SKIN ONE TIME PER WEEK     HGBA1C:    Lab Results   Component Value Date    A1C 8.1 (H) 06/04/2024    A1C 9.8 (H) 02/01/2024    A1C 8.7 (A) 08/10/2023     (H) 06/04/2024     Your Appointments      Tuesday October 08, 2024 10:00 AM  Diabetes Pump follow up with COLTON Castro  [facility] [address]  [facility] hospital [id number] [phone]               Last OFFICE VISIT: 6-4-2024-CB    Last refill:  5--9 ml with 0 refills

## 2024-10-21 DIAGNOSIS — R80.9 TYPE 2 DIABETES MELLITUS WITH MICROALBUMINURIA (HCC): ICD-10-CM

## 2024-10-21 DIAGNOSIS — E11.29 TYPE 2 DIABETES MELLITUS WITH MICROALBUMINURIA (HCC): ICD-10-CM

## 2024-10-22 RX ORDER — DAPAGLIFLOZIN 10 MG/1
10 TABLET, FILM COATED ORAL DAILY
Qty: 90 TABLET | Refills: 0 | Status: SHIPPED | OUTPATIENT
Start: 2024-10-22

## 2024-10-22 NOTE — TELEPHONE ENCOUNTER
Requested Prescriptions     Pending Prescriptions Disp Refills    FARXIGA 10 MG Oral Tab [Pharmacy Med Name: FARXIGA 10 MG TABLET] 90 tablet 0     Sig: TAKE 1 TABLET BY MOUTH EVERY DAY     Future Appointments   Date Time Provider Department Center   10/30/2024  7:30 AM Heidy Grover APRN [id number] [place]     Last A1c value was 8.1% done 6/4/2024.  Refill 4/10/24 C.Boss   LOV 6/4/24 Nadine

## 2024-10-30 ENCOUNTER — OFFICE VISIT (OUTPATIENT)
Dept: ENDOCRINOLOGY CLINIC | Facility: CLINIC | Age: 59
End: 2024-10-30
Payer: COMMERCIAL

## 2024-10-30 VITALS
HEART RATE: 80 BPM | SYSTOLIC BLOOD PRESSURE: 130 MMHG | RESPIRATION RATE: 16 BRPM | BODY MASS INDEX: 30 KG/M2 | DIASTOLIC BLOOD PRESSURE: 70 MMHG | OXYGEN SATURATION: 97 % | WEIGHT: 161.81 LBS

## 2024-10-30 DIAGNOSIS — R51.9 HEADACHE CAUSING FREQUENT AWAKENING FROM SLEEP: ICD-10-CM

## 2024-10-30 DIAGNOSIS — Z79.4 TYPE 2 DIABETES MELLITUS WITH HYPERGLYCEMIA, WITH LONG-TERM CURRENT USE OF INSULIN (HCC): Primary | ICD-10-CM

## 2024-10-30 DIAGNOSIS — E78.2 MIXED HYPERLIPIDEMIA: ICD-10-CM

## 2024-10-30 DIAGNOSIS — I10 PRIMARY HYPERTENSION: ICD-10-CM

## 2024-10-30 DIAGNOSIS — E11.65 TYPE 2 DIABETES MELLITUS WITH HYPERGLYCEMIA, WITH LONG-TERM CURRENT USE OF INSULIN (HCC): Primary | ICD-10-CM

## 2024-10-30 LAB
GLUCOSE BLOOD: 140
HEMOGLOBIN A1C: 7.4 % (ref 4.3–5.6)
TEST STRIP LOT #: NORMAL NUMERIC

## 2024-10-30 PROCEDURE — 83036 HEMOGLOBIN GLYCOSYLATED A1C: CPT | Performed by: NURSE PRACTITIONER

## 2024-10-30 PROCEDURE — 82947 ASSAY GLUCOSE BLOOD QUANT: CPT | Performed by: NURSE PRACTITIONER

## 2024-10-30 PROCEDURE — 99214 OFFICE O/P EST MOD 30 MIN: CPT | Performed by: NURSE PRACTITIONER

## 2024-10-30 PROCEDURE — 3075F SYST BP GE 130 - 139MM HG: CPT | Performed by: NURSE PRACTITIONER

## 2024-10-30 PROCEDURE — 3078F DIAST BP <80 MM HG: CPT | Performed by: NURSE PRACTITIONER

## 2024-10-30 RX ORDER — DAPAGLIFLOZIN 10 MG/1
10 TABLET, FILM COATED ORAL DAILY
Qty: 90 TABLET | Refills: 1 | Status: SHIPPED | OUTPATIENT
Start: 2024-10-30

## 2024-10-30 NOTE — PROGRESS NOTES
Chief Complaint   Patient presents with    Diabetes     Follow up forgot meter BG at home fasting 144        Marcy oMon is a 59 year old presenting for  type 2 diabetes management.   Primary care physician: Abi Jose MD  Last DM appt 6-2024   Citizen of [place] speaking; declined language line today; daughter present today     C.o headaches today; start 2-3 am and wakes her out of sleep   Feels pain increased as she lays on her head.   Does not take medication to relieve pain   Denies blurred vision, paresthesias, N, v associated with headache   Has reported similar sxs to PCP - but feels they are becoming more prevalent         Most recent   A1C 7.4 %  ( last A1C  8.1% )    mg/dl in office     Cost barrier w Dexcom 4-2024   Testing BG 2 x daily     Has not needed humalog > 2m (since starting ozempic)       Diabetes History:  Type 2 DM ~ 2000     Patient has not had hospitalizations for blood sugar issues  denies any history of pancreatitis      Previous DM therapies:  NOVOLOG 70-30 ( insurance formulary )   Januvia 100 mg once daily --changed to GLP 3-2024   75-25 insulin; improved trends 4-204       Current DM Regimen:  Metformin 1000mg twice daily   Farxiga 10mg once daily   Ozempic 0.5mg subcutaneous once weekly     Humalog kwik pen: at meals per sliding scale;     if blood sugar is  200-240 , dose 2 units   241-280, dose 3 units  Over 281, dose 4 units            HGBA1C:    Lab Results   Component Value Date    A1C 7.4 (A) 10/30/2024    A1C 8.1 (H) 06/04/2024    A1C 9.8 (H) 02/01/2024     (H) 06/04/2024       Lab Results   Component Value Date    CHOLEST 185 06/04/2024    CHOLEST 204 (H) 02/01/2024    TRIG 201 (H) 06/04/2024    TRIG 206 (H) 02/01/2024    HDL 47 06/04/2024    HDL 45 02/01/2024     (H) 06/04/2024     (H) 02/01/2024     Lab Results   Component Value Date    MICROALBCREA 70.4 (H) 02/01/2024      Lab Results   Component Value Date    CREATSERUM 0.63 06/04/2024     CREATSERUM 0.65 2024    EGFRCR 104 2024    EGFRCR 103 2024     Lab Results   Component Value Date    AST 16 2024    AST 19 2024    ALT 14 2024    ALT 23 2024       Lab Results   Component Value Date    TSH 2.390 2024    TSH 3.120 2022           DM Complications:  Microvascular:   Neuropathy: yes  Retinopathy: no  Nephropathy: yes    Macrovascular:  PVD: no  CAD: no  Stroke/CVA: no        Modifying factors:  Medication adherence: yes   Recent steroids, illness or infections ( past 3m): no     Allergies: Patient has no known allergies.    Past Medical History:    Diabetes (HCC)    Diabetes mellitus (HCC)    Essential hypertension     History reviewed. No pertinent surgical history.  Social History     Socioeconomic History    Marital status:    Tobacco Use    Smoking status: Never    Smokeless tobacco: Never   Vaping Use    Vaping status: Never Used   Substance and Sexual Activity    Alcohol use: Not Currently    Drug use: Never     Social Drivers of Health     Financial Resource Strain: Not on File (10/5/2022)    [facility]    Financial Resource Strain     Financial Resource Strain: 0   Food Insecurity: Not on File (2024)    Received from [facility]    Food Insecurity     Food: 0   Transportation Needs: Not on File (10/5/2022)    Received from [facility]    Transportation Needs     Transportation: 0   Physical Activity: Not on File (10/5/2022)    Received from [facility]    Physical Activity     Physical Activity: 0   Stress: Not on File (10/5/2022)    Received from [facility]    Stress     Stress: 0   Social Connections: Not on File (2024)    Received from [facility]    Social Connections     Connectedness: 0   Housing Stability: Not on File (10/5/2022)    Received from [facility]    Housing Stability     Housin     Family History   Problem Relation Age of Onset    Diabetes Father     Diabetes Maternal Grandmother     Diabetes  Sister     Diabetes Brother      Current Medication List:   Current Outpatient Medications   Medication Sig Dispense Refill    semaglutide 4 MG/3ML Subcutaneous Solution Pen-injector Inject 1 mg into the skin once a week. 9 mL 1    metFORMIN HCl 1000 MG Oral Tab Take 1 tablet (1,000 mg total) by mouth 2 (two) times daily with meals. 180 tablet 1    FARXIGA 10 MG Oral Tab Take 1 tablet (10 mg total) by mouth daily. 90 tablet 1    atorvastatin 80 MG Oral Tab Take 1 tablet (80 mg total) by mouth nightly. 90 tablet 3    Insulin Lispro, 1 Unit Dial, (HUMALOG KWIKPEN) 100 UNIT/ML Subcutaneous Solution Pen-injector Up to 15 units daily based on BG reading at meals 15 mL 0    lisinopril 40 MG Oral Tab Take 1 tablet (40 mg total) by mouth daily. 1x daily 90 tablet 3    amLODIPine 5 MG Oral Tab Take 1 tablet (5 mg total) by mouth daily. 90 tablet 3    gabapentin 100 MG Oral Cap Take 1 capsule (100 mg total) by mouth 3 (three) times daily. 90 capsule 1    clotrimazole-betamethasone 1-0.05 % External Cream Apply 1 Application topically 2 (two) times daily as needed. 60 g 0           DM associated review of  symptoms:   Endocrine: Polyuria, polyphagia, polydipsia: no  Neurological: Paresthesias: yes LE   HEENT: Blurred vision: no  Skin: no rash or wounds  Hematological: Hypoglycemia: no      Review of Systems     LUNGS: denies shortness of breath   CARDIOVASCULAR: denies chest pain  GI: denies abdominal pain, nausea or diarrhea   : denies dysuria          Physical exam:  /70   Pulse 80   Resp 16   Wt 161 lb 12.8 oz (73.4 kg)   SpO2 97%   BMI 29.59 kg/m²   Body mass index is 29.59 kg/m².    Physical Exam     Constitutional: Normal appearance   Cardiovascular: Normal rate   Pulmonary/Chest: Effort normal  Neurological: Alert and oriented .   Psychiatric: Normal mood and affect.   Musculoskeletal:  Diabetes foot exam:   Good foot hygiene.   Bilateral barefoot skin diabetic exam: normal.  Visualized feet and the  appearance : normal.  Bilateral monofilament/sensation of both feet is normal. Vibration to dorsum to the first toe perceived.   Bilateral 2+ pedal pulse pedal pulse exam          Assessment/Plan:    Hypertension  Needs ongoing monitoring   CPM     Headache  Follow up with PCP   If symptoms worsen, to ED       Nephropathy   Continue  ace rx , Farxiga   Encouraged improved glycemic targets to help kidneys long term health     Dyslipidemia:   Last  labs done 5-2024   Udpate in 3 m   Continue  Atorvastatin rx      Obesity  Desires to lose more weight   Weight: 161 lb today   Increase  GLP --> see DM note         Type 2 diabetes mellitus with hyperglycemia, with long-term current use of insulin ([facility])  A1C: 7.4 % ( last A1C 8.1 %)  Weight 161 lb ( last  diabetes center weight: 160 lb )    Diabetes control is improving but needs ongoing management and evaluation  given the chronicity of Diabetes, ongoing medication monitoring and risk for complications   Reviewed with patient health impact associated with high glucose trends and the importance of better glucose control to prevent onset /progression of DM complications.     Continue Ozempic 0.5mg--> 1 mg  subcutaneous once weekly   Continue   Metformin 1000mg twice daily   Farxiga 10mg once daily      Reminded to call if any rash, itching in genital area or painful urination develops  Discussed the importance of hydration. Reviewed Sick day management and avoidance of keto diets     Humalog kwik pen: sliding scale as needed    If blood sugar is  200-240 , dose 2 units   241-280, dose 3 units  Over 281, dose 4 units            Reviewed , clinical significance of A1c, adverse effects of suboptimal glucose control, and goals of therapy   Reviewed the A1C test, what the value reflects and the goal for the patient.   Reminded pt on A1C and blood sugar targets (Fasting < 130 and post prandial <180 ) and complications associated with hyperglycemia and uncontrolled DM (on AVS)    Recommended SMBG 2- x daily if not using CGM   Reviewed s/s and treatment of hypoglycemia (on AVS)   Reminded to continue with lifestyle modifications since they have positive impact on diabetes/blood sugars/health (portion control, physical activity, weight loss)   Reinforced timing and adherence with medication, self-monitoring of blood glucose and routine follow up      The patient is asked to return in 4- m in office  but recommended to contact DM clinic sooner if questions or concerns.    The patient indicates understanding of these issues and agrees to the plan.      Orders Placed This Encounter    POC Hemoglobin A1C     Order Specific Question:   Release to patient     Answer:   Immediate    ASSAY QUANTITATIVE, GLUCOSE     Order Specific Question:   Release to patient     Answer:   Immediate    Comp Metabolic Panel (14)     Standing Status:   Future     Standing Expiration Date:   10/30/2025    Lipid Panel     Standing Status:   Future     Standing Expiration Date:   10/30/2025    Microalb/Creat Ratio, Random Urine     Standing Status:   Future     Standing Expiration Date:   10/30/2025    TSH W Reflex To Free T4     Standing Status:   Future     Standing Expiration Date:   10/30/2025     Order Specific Question:   Release to patient     Answer:   Immediate    semaglutide 4 MG/3ML Subcutaneous Solution Pen-injector     Sig: Inject 1 mg into the skin once a week.     Dispense:  9 mL     Refill:  1    metFORMIN HCl 1000 MG Oral Tab     Sig: Take 1 tablet (1,000 mg total) by mouth 2 (two) times daily with meals.     Dispense:  180 tablet     Refill:  1    FARXIGA 10 MG Oral Tab     Sig: Take 1 tablet (10 mg total) by mouth daily.     Dispense:  90 tablet     Refill:  1     BIN:[id number] PCN:[id number] GR: [id number] ID:[id number]     Diabetes complications & risks surveillance:       A1C/Blood pressure: as reported above   Nephropathy screenin  continue ace /arb rx.   LIPID screenin  . atorva statin  rx.   Last dilated eye exam: No data recorded Exam shows retinopathy? No data recorded  Last diabetic foot exam: Last Foot Exam: 10/30/24    Spent 40 min obtaining patient history, evaluating patient, reviewing blood glucose trends, discussing treatment options, lifestyle modifications and completing documentation - and translation for Georgian   The risks and benefits of my recommendations, as well as other treatment options were discussed with the patient today. questions were also answered to the best of my knowledge.

## 2024-10-30 NOTE — PATIENT INSTRUCTIONS
Mahmood A1C: 7.4%    por favor eliud un seguimiento con el Dr. Lyman para jordan lilia de rafa.     Necesitas starr al oculista ya que en la fotografía que tomamos de tus ojos se encontró algo de retinopatía.       Continuaremos trabajando juntos para mantener jordan niveles de azúcar en benjie en un objetivo saludable.      El objetivo principal del tratamiento de la diabetes es evitar que mahmood nivel de azúcar suba demasiado. Medimos jordan tendencias generales de azúcar en benjie con estella prueba de hemoglobina A1C. (también llamado A1C) Para la mayoría de las personas, el objetivo es menos del 7.0%, sony a veces hacemos excepciones basadas en la edad, el historial de dafne y otros factores.  Mantener un A1C por debajo del 7% ayuda a prevenir problemas de dafne relacionados con la diabetes.  Si mahmood A1C aumenta demasiado, entonces debemos hablar sobre cambiar mahmood tratamiento actual para la diabetes.      MEDICAMENTOS:    tus tendencias están funcionando mucho mejor     Podemos aumentar Ozempic a 1 mg semanal.   terminar la pluma ozempic actual: la nueva receta se envió a mahmood [facility]   comience a grant Ozempic de 1 mg cuando se le acabe la pluma de Ozempic de 0,5 mg     Continuar      Metformina 1000 mg dos veces al día   Farxiga 10 mg estella vez al día      Se le recuerda llamar si se desarrolla algún sarpullido, picazón en el área genital o dolor al orinar.  Insulina Humalog según sea necesario:     Si el nivel de azúcar en la benjie es  200-240, dosis 2 unidades   241-280, dosis 3 unidades  Más de 281, dosis 4 unidades    Es importante grant todos jordan medicamentos según lo prescrito.  Además, llámeme si tiene algún problema con preguntas sobre medicamentos, efectos secundarios, preguntas sobre la dosificación o problemas con las tendencias de azúcar en benjie ANTES DE CAMBIAR O DETENER CUALQUIER MEDICAMENTO.    Prueba de azúcar en benjie:  Recuerde llevar mahmood medidor de glucosa o registro de azúcar en benjie a cada alicia en el  centro de diabetes.  [place] me permite realizar ajustes de forma keith en mahmood plan de diabetes.  Para poder determinar cualquier patrón en mahmood nivel de azúcar en benjie, necesitará medir mahmood nivel de azúcar en benjie 2 veces al día  Momentos recomendados para realizar la prueba: antes del desayuno (en ayunas) y luego alternar la prueba de azúcar en benjie 2 horas después de las comidas.    Objetivos de azúcar en benjie:  Antes del desayuno:  (preferiblemente menos de 110)  2 horas Después de las comidas: menos de 180 (preferiblemente menos de 150)  Solicite niveles de azúcar en benjie persistentes inferiores a 75 o superiores a 200.  Los niveles de azúcar en benjie superiores a 200 no son aceptables para alcanzar mahmood objetivo de mejorar la diabetes  Esté atento a los niveles bajos de azúcar en benjie: (menos de 70)      Síntomas de niveles bajos de azúcar en benjie:  Temblores o mareos  Piel fría y húmeda o sudorosa  Tener hambre  Dolor de rafa  Nerviosismo  Un latido maria dolores y rápido  Debilidad  Confusión o irritabilidad  [place] borrosa  Tener pesadillas o despertarse confundido o sudando  Entumecimiento u hormigueo en los labios o la lengua    Plan de acción para el tratamiento de la hipoglucemia  1. Revise la glucosa en benjie para asegurarse de que esté baja. No siempre puedes seguir los síntomas. En anuj de nadya, trate mahmood nivel bajo de glucosa en benjie de todos modos.  2. [place] 15 gramos de carbohidratos (carbohidratos). Aquí hay algunas opciones:  4 onzas. jugo de fruta regular  3-4 tabletas de glucosa  6 onzas. refresco regular  7-8 gominolas  3. Vuelva a controlar la glucosa en benjie después de 10 a 15 minutos. Si la glucosa en benjie sigue siendo baja (menos de 70 mg / dl) repita el tratamiento (paso 2).  4. Si falta más de estella hora para mahmood próxima comida, coma un bocadillo pequeño.  5. Si no está seguro de la causa de mahmood nivel bajo de glucosa en benjie, llame a mahmood proveedor de atención médica.  6.  Siempre controle mahmood glucosa en benjie antes de conducir          Nadeem  Heidy Grover  [phone]

## 2024-11-22 NOTE — TELEPHONE ENCOUNTER
OZEMPIC 0.25-0.5 MG/DOSE PEN          Will file in chart as: OZEMPIC, 0.25 OR 0.5 MG/DOSE, 2 MG/3ML Subcutaneous Solution Pen-injector    The original prescription was discontinued on 10/30/2024 by Heidy Grover APRN for the following reason: Dose adjustment. Renewing this prescription may not be appropriate.     Last OFFICE VISIT: 10--CB    Continue Ozempic 0.5mg--> 1 mg  subcutaneous once weekly   Continue   Metformin 1000mg twice daily   Farxiga 10mg once daily        Message to patient to clarify which dosage of Ozempic she is on.

## 2024-12-11 NOTE — TELEPHONE ENCOUNTER
Future Appointments   Date Time Provider Department Center   2/19/2025  7:30 AM Heidy Grover, [id number]     Requested Prescriptions     Pending Prescriptions Disp Refills    OZEMPIC, 0.25 OR 0.5 MG/DOSE, 2 MG/3ML Subcutaneous Solution Pen-injector [Pharmacy Med Name: OZEMPIC 0.25-0.5 MG/DOSE PEN]  0     Sig: INJECT 0.5 MG INTO THE SKIN ONE TIME PER WEEK     Refill 10/30/24 Nadine   LOV 10/30/24 Nadine   Last A1c value was 7.4% done 10/30/2024.

## 2024-12-12 RX ORDER — SEMAGLUTIDE 0.68 MG/ML
0.5 INJECTION, SOLUTION SUBCUTANEOUS WEEKLY
Refills: 0 | OUTPATIENT
Start: 2024-12-12

## 2025-01-09 ENCOUNTER — HOSPITAL ENCOUNTER (OUTPATIENT)
Age: 60
Discharge: HOME | End: 2025-01-09
Attending: EMERGENCY MEDICINE
Payer: COMMERCIAL

## 2025-01-09 VITALS
SYSTOLIC BLOOD PRESSURE: 138 MMHG | OXYGEN SATURATION: 99 % | TEMPERATURE: 98 F | RESPIRATION RATE: 16 BRPM | DIASTOLIC BLOOD PRESSURE: 69 MMHG | HEART RATE: 82 BPM

## 2025-01-09 DIAGNOSIS — L30.9 DERMATITIS: ICD-10-CM

## 2025-01-09 DIAGNOSIS — N30.00 ACUTE CYSTITIS WITHOUT HEMATURIA: Primary | ICD-10-CM

## 2025-01-09 LAB
BILIRUB UR QL STRIP: NEGATIVE
COLOR UR: YELLOW
GLUCOSE UR STRIP-MCNC: 500 MG/DL
HGB UR QL STRIP: NEGATIVE
KETONES UR STRIP-MCNC: NEGATIVE MG/DL
NITRITE UR QL STRIP: NEGATIVE
PH UR STRIP: 5.5 [PH]
PROT UR STRIP-MCNC: NEGATIVE MG/DL
SP GR UR STRIP: 1.01
UROBILINOGEN UR STRIP-ACNC: <2 MG/DL

## 2025-01-09 PROCEDURE — 81002 URINALYSIS NONAUTO W/O SCOPE: CPT

## 2025-01-09 PROCEDURE — 87186 SC STD MICRODIL/AGAR DIL: CPT | Performed by: EMERGENCY MEDICINE

## 2025-01-09 PROCEDURE — 87086 URINE CULTURE/COLONY COUNT: CPT | Performed by: EMERGENCY MEDICINE

## 2025-01-09 PROCEDURE — 87088 URINE BACTERIA CULTURE: CPT | Performed by: EMERGENCY MEDICINE

## 2025-01-09 PROCEDURE — 99214 OFFICE O/P EST MOD 30 MIN: CPT

## 2025-01-09 RX ORDER — CEFADROXIL 500 MG/1
500 CAPSULE ORAL 2 TIMES DAILY
Qty: 14 CAPSULE | Refills: 0 | Status: SHIPPED | OUTPATIENT
Start: 2025-01-09 | End: 2025-01-16

## 2025-01-09 RX ORDER — PHENAZOPYRIDINE HYDROCHLORIDE 200 MG/1
200 TABLET, FILM COATED ORAL 3 TIMES DAILY PRN
Qty: 6 TABLET | Refills: 0 | Status: SHIPPED | OUTPATIENT
Start: 2025-01-09 | End: 2025-01-11

## 2025-01-09 RX ORDER — TRIAMCINOLONE ACETONIDE 1 MG/G
CREAM TOPICAL 2 TIMES DAILY
Qty: 15 G | Refills: 0 | Status: SHIPPED | OUTPATIENT
Start: 2025-01-09 | End: 2025-01-16

## 2025-01-09 NOTE — ED INITIAL ASSESSMENT (HPI)
Patient reports per  number [id number] patient reports burning upon urination, frequency, and lower abdominal pain.  Patient reports symptoms for about 2 weeks.  Patient also reports a rash to her left buttock for about 1 week.

## 2025-01-19 NOTE — ED PROVIDER NOTES
Patient Seen in: [facility]      History     Chief Complaint   Patient presents with    Urinary Symptoms     Stated Complaint: Rash; Urinary Symptoms    Subjective:   HPI      58 yo with dysuria, frequency and urgency for about two weeks. Some discomfort to the lower abdomen. No flank pain. No fever. No vomiting. Has also had an itchy rash to her left buttock for about one week.     Objective:     Past Medical History:    Diabetes (HCC)    Diabetes mellitus (HCC)    Essential hypertension              History reviewed. No pertinent surgical history.             Social History     Socioeconomic History    Marital status:    Tobacco Use    Smoking status: Never    Smokeless tobacco: Never   Vaping Use    Vaping status: Never Used   Substance and Sexual Activity    Alcohol use: Not Currently    Drug use: Never     Social Drivers of Health     Financial Resource Strain: Not on File (10/5/2022)    [facility] from [facility]    Financial Resource Strain     Financial Resource Strain: 0   Food Insecurity: Not on File (2024)    Received from [facility]    Food Insecurity     Food: 0   Transportation Needs: Not on File (10/5/2022)    Received from [facility]    Transportation Needs     Transportation: 0   Physical Activity: Not on File (10/5/2022)    Received from [facility]    Physical Activity     Physical Activity: 0   Stress: Not on File (10/5/2022)    Received from [facility]    Stress     Stress: 0   Social Connections: Not on File (2024)    Received from [facility]    Social Connections     Connectedness: 0   Housing Stability: Not on File (10/5/2022)    Received from [facility]    Housing Stability     Housin              Review of Systems    Positive for stated complaint: Rash; Urinary Symptoms  Other systems are as noted in HPI.  Constitutional and vital signs reviewed.      All other systems reviewed and negative except as noted above.    Physical Exam     ED Triage Vitals  [01/09/25 1158]   /69   Pulse 82   Resp 16   Temp 98.1 °F (36.7 °C)   Temp src Oral   SpO2 99 %   O2 Device None (Room air)       Current Vitals:   No data recorded    Physical Exam  Vitals and nursing note reviewed.   Constitutional:       Appearance: Normal appearance. She is well-developed.   HENT:      Head: Normocephalic and atraumatic.   Cardiovascular:      Rate and Rhythm: Normal rate and regular rhythm.   Pulmonary:      Effort: Pulmonary effort is normal. No respiratory distress.   Abdominal:      General: There is no distension.      Palpations: Abdomen is soft.      Tenderness: There is abdominal tenderness (mild suprapubic). There is no right CVA tenderness or left CVA tenderness.   Skin:     General: Skin is warm and dry.      Capillary Refill: Capillary refill takes less than 2 seconds.      Comments: Left buttock: erythematous papular rash. Not vesicular. No abscess. No cellulitis.    Neurological:      General: No focal deficit present.      Mental Status: She is alert.   Psychiatric:         Mood and Affect: Mood normal.         Behavior: Behavior normal.            ED Course     Labs Reviewed   [facility] POCT URINALYSIS DIPSTICK - Abnormal; Notable for the following components:       Result Value    Urine Clarity Slightly cloudy (*)     Glucose, Urine 500 (*)     Leukocyte esterase urine Trace (*)     All other components within normal limits   URINE CULTURE, ROUTINE - Abnormal; Notable for the following components:    Urine Culture 10,000 - 50,000 CFU/ML Escherichia coli (*)     All other components within normal limits                   MDM           Medical Decision Making    Bacterial cystitis, interstitial cystitis, overactive bladder syndrome all in differential. UA reviewed by myself and is consistent with infection. Discharge on duricef and pyridium as prescribed.   Dermatitis, bacterial infection, fungal infection all in differential.   Exam findings of nonspecific dermatitis. Discharge  on triamcinolone as prescribed.  Disposition and Plan     Clinical Impression:  1. Acute cystitis without hematuria    2. Dermatitis         Disposition:  Discharge  1/9/2025 12:26 pm    Follow-up:  Abi [facility]  [place] [address] [address]  [phone]      As needed          Medications Prescribed:  Discharge Medication List as of 1/9/2025 12:27 PM        START taking these medications    Details   cefadroxil 500 MG Oral Cap Take 1 capsule (500 mg total) by mouth 2 (two) times daily for 7 days., Normal, Disp-14 capsule, R-0      triamcinolone 0.1 % External Cream Apply topically 2 (two) times daily for 7 days., Normal, Disp-15 g, R-0      phenazopyridine 200 MG Oral Tab Take 1 tablet (200 mg total) by mouth 3 (three) times daily as needed for Pain., Normal, Disp-6 tablet, R-0                 Supplementary Documentation:

## 2025-02-18 NOTE — PROGRESS NOTES
Chief Complaint   Patient presents with    Diabetes     Follow up forgot meter BG in office fasting 130       Marcy Moon is a 59 year old presenting for  type 2 diabetes management.   Primary care physician: Abi Jose MD  Last Diabetes appointment with me  --> increased ozempic to 1mg weekly     Indonesian speaking; declined language line today; preferred MA translate for appointment    1-2025 UTI ; on abx and symptoms improved. Does have nocturia but denies dysuria     Today's    A1C 7.6  %  ( last A1C 7.4% )   BG  134 mg/dl in office   Has been off farxiga past 2 weeks --> issues at pharmacy?    Cost barrier w Dexcom 4-2024        Diabetes History:  Type 2 DM ~ 2000     Patient has not had hospitalizations for blood sugar issues  denies any history of pancreatitis      Previous DM therapies:  NOVOLOG 70-30 ( insurance formulary )   Januvia --changed to GLP 3-2024   75-25 insulin; improved trends 4-204       Current DM Regimen:  Metformin 1000mg twice daily   Farxiga 10mg once daily (--> off past 2 weeks)   Ozempic  1.0 mg subcutaneous once weekly     Humalog kwik pen: at meals per sliding scale;  --> dosing 1-2 x month     if blood sugar is  180-240 , dose 2 units   241-280, dose 3 units  Over 281, dose 4 units            HGBA1C:    Lab Results   Component Value Date    A1C 7.6 (A) 02/19/2025    A1C 7.4 (A) 10/30/2024    A1C 8.1 (H) 06/04/2024     (H) 06/04/2024       Lab Results   Component Value Date    CHOLEST 185 06/04/2024    CHOLEST 204 (H) 02/01/2024    TRIG 201 (H) 06/04/2024    TRIG 206 (H) 02/01/2024    HDL 47 06/04/2024    HDL 45 02/01/2024     (H) 06/04/2024     (H) 02/01/2024     Lab Results   Component Value Date    MICROALBCREA 70.4 (H) 02/01/2024      Lab Results   Component Value Date    CREATSERUM 0.63 06/04/2024    CREATSERUM 0.65 02/01/2024    EGFRCR 104 06/04/2024    EGFRCR 103 02/01/2024     Lab Results   Component Value Date    AST 16 06/04/2024    AST  19 2024    ALT 14 2024    ALT 23 2024       Lab Results   Component Value Date    TSH 2.390 2024    TSH 3.120 2022           DM Complications:  Microvascular:   Neuropathy: yes  Retinopathy: yes   Nephropathy: yes    Macrovascular:  PVD: no  CAD: no  Stroke/CVA: no        Modifying factors:  Medication adherence: yes   Barriers: cost of prescription (dexcom )   Recent steroids, illness or infections ( past 3m): no     Allergies: Patient has no known allergies.    Past Medical History:    Diabetes (HCC)    Diabetes mellitus (HCC)    Essential hypertension     History reviewed. No pertinent surgical history.  Social History     Socioeconomic History    Marital status:    Tobacco Use    Smoking status: Never    Smokeless tobacco: Never   Vaping Use    Vaping status: Never Used   Substance and Sexual Activity    Alcohol use: Not Currently    Drug use: Never     Social Drivers of Health     Food Insecurity: Not on File (2024)    Received from [facility] [employer]    Food Insecurity     Food: 0   Transportation Needs: Not on File (10/5/2022)    Received from [facility]    Transportation Needs     Transportation: 0   Stress: Not on File (10/5/2022)    Received from [facility]    Stress     Stress: 0   Housing Stability: Not on File (10/5/2022)    Received from [employer]    Housing Stability     Housin     Family History   Problem Relation Age of Onset    Diabetes Father     Diabetes Maternal Grandmother     Diabetes Sister     Diabetes Brother      Current Medication List:   Current Outpatient Medications   Medication Sig Dispense Refill    FARXIGA 10 MG Oral Tab Take 1 tablet (10 mg total) by mouth daily. 90 tablet 1    semaglutide 8 MG/3ML Subcutaneous Solution Pen-injector Inject 2 mg into the skin once a week. 9 mL 1    metFORMIN HCl 1000 MG Oral Tab Take 1 tablet (1,000 mg total) by mouth 2 (two) times daily with meals. 180 tablet 1    gabapentin 100 MG Oral Cap Take 1 capsule  (100 mg total) by mouth 3 (three) times daily. 90 capsule 1    atorvastatin 80 MG Oral Tab Take 1 tablet (80 mg total) by mouth nightly. 90 tablet 3    lisinopril 40 MG Oral Tab Take 1 tablet (40 mg total) by mouth daily. 1x daily 90 tablet 3    amLODIPine 5 MG Oral Tab Take 1 tablet (5 mg total) by mouth daily. 90 tablet 3    clotrimazole-betamethasone 1-0.05 % External Cream Apply 1 Application topically 2 (two) times daily as needed. (Patient not taking: Reported on 1/9/2025) 60 g 0    Insulin Lispro, 1 Unit Dial, (HUMALOG KWIKPEN) 100 UNIT/ML Subcutaneous Solution Pen-injector Up to 15 units daily based on BG reading at meals 15 mL 0           DM associated review of  symptoms:   Endocrine: Polyuria, polyphagia, polydipsia: no  Neurological: Paresthesias: yes B LE   HEENT: Blurred vision: no  Skin: no rash or wounds  Hematological: Hypoglycemia: no      Review of Systems     LUNGS: denies shortness of breath   CARDIOVASCULAR: denies chest pain  GI: denies abdominal pain, nausea or diarrhea   : denies dysuria      Physical exam:  /70   Pulse 85   Resp 17   Wt 163 lb 3.2 oz (74 kg)   SpO2 97%   BMI 29.85 kg/m²   Body mass index is 29.85 kg/m².    Physical Exam     Constitutional: Normal appearance   Cardiovascular: Normal rate , normal rhythm   Pulmonary/Chest: Effort normal  Neurological: Alert and oriented .   Psychiatric: Normal mood and affect.   Musculoskeletal:  Diabetes foot exam:   Good foot hygiene.   Bilateral barefoot skin diabetic exam: normal.  + great toe mycotic nails . Mike horn toenail #2 on R foot.     Visualized feet and the appearance : normal.  Bilateral monofilament/sensation of both feet is normal. Vibration to dorsum to the first toe perceived.   Bilateral 2+ pedal pulse pedal pulse exam          Assessment/Plan:    Hypertension  Well controlled   CPM         Nephropathy   Reminded patient impact glycemic trends have on kidney health   Needs ongoing monitoring   Lab Results    Component Value Date    EGFRCR 104 06/04/2024    MICROALBCREA 70.4 (H) 02/01/2024   Restart  farxiga and continue Ozempic  2025 FDA label upate: Ozempic  reduces the risk of sustained egfr decline, ESRD, and CV death in adults with type 2 diabetes and chronic kidney disease           Dyslipidemia:   Last  labs done 5-2024 --> update ordered.   Continue  Atorvastatin rx      Obesity  Increased.   Weight: 163 lb today   Increase  GLP --> see DM note         Type 2 diabetes mellitus with hyperglycemia, with long-term current use of insulin ([facility])  A1C: 7.6  % ( last A1C 7.4  %)  Weight  163 lb ( last  diabetes center weight: 161 lb )    Diabetes control is increased and needs ongoing management and evaluation  given the chronicity of Diabetes, ongoing medication monitoring and risk for complications   Reviewed with patient health impact associated with high glucose trends and the importance of better glucose control to prevent onset /progression of DM complications.     Increase Ozempic   1 mg--> 2.0 mg   subcutaneous once weekly   ~consider change to Mounjaro but prefer to stay w Ozempic given CKD benefit     Continue   Metformin 1000mg twice daily   Farxiga 10mg once daily      Reminded to call if any rash, itching in genital area or painful urination develops  Discussed the importance of hydration. Reviewed Sick day management and avoidance of keto diets     Humalog kwik pen: sliding scale as needed    If blood sugar is  180-240 , dose 2 units   241-280, dose 3 units  Over 281, dose 4 units        Reviewed , clinical significance of A1c, adverse effects of suboptimal glucose control, and goals of therapy   Reviewed the A1C test, what the value reflects and the goal for the patient.   Reminded pt on A1C and blood sugar targets (Fasting < 130 and post prandial <180 ) and complications associated with hyperglycemia and uncontrolled DM (on AVS)   Recommended SMBG 2- x daily if not using CGM   Reviewed s/s and treatment  of hypoglycemia (on AVS)   Reminded to continue with lifestyle modifications since they have positive impact on diabetes/blood sugars/health (portion control, physical activity, weight loss)   Reinforced timing and adherence with medication, self-monitoring of blood glucose and routine follow up  Urine m/alb collected today      The patient is asked to return in 4- m in office  but recommended to contact DM clinic sooner if questions or concerns.    The patient indicates understanding of these issues and agrees to the plan.      Orders Placed This Encounter    Microalb/Creat Ratio, Random Urine     Standing Status:   Future     Number of Occurrences:   1     Standing Expiration Date:   2/19/2026     Order Specific Question:   Release to patient     Answer:   Immediate    POC Hemoglobin A1C     Order Specific Question:   Release to patient     Answer:   Immediate    ASSAY QUANTITATIVE, GLUCOSE     Order Specific Question:   Release to patient     Answer:   Immediate    Comp Metabolic Panel (14)     Standing Status:   Future     Standing Expiration Date:   2/19/2026    Lipid Panel     Standing Status:   Future     Standing Expiration Date:   2/19/2026    TSH W Reflex To Free T4     Standing Status:   Future     Standing Expiration Date:   2/19/2026     Order Specific Question:   Release to patient     Answer:   Immediate    Urinalysis with Culture Reflex     Standing Status:   Future     Number of Occurrences:   1     Standing Expiration Date:   2/19/2026     Order Specific Question:   Release to patient     Answer:   Immediate    FARXIGA 10 MG Oral Tab     Sig: Take 1 tablet (10 mg total) by mouth daily.     Dispense:  90 tablet     Refill:  1     BIN:[id number] PCN:[id number] GR: [id number] ID:[id number]    semaglutide 8 MG/3ML Subcutaneous Solution Pen-injector     Sig: Inject 2 mg into the skin once a week.     Dispense:  9 mL     Refill:  1     Diabetes complications & risks surveillance:   A1C/Blood pressure: as  reported  Nephropathy screenin  continue ace /arb rx.   LIPID screenin  . atorva statin rx.   Last dilated eye exam: Last Dilated Eye Exam: 24   Exam shows retinopathy? Eye Exam shows Diabetic Retinopathy?: Yes  Last diabetic foot exam: Last Foot Exam: 25    The risks and benefits of my recommendations, as well as other treatment options were discussed with the patient today. questions were also answered to the best of my knowledge.

## 2025-02-19 ENCOUNTER — OFFICE VISIT (OUTPATIENT)
Dept: ENDOCRINOLOGY CLINIC | Facility: CLINIC | Age: 60
End: 2025-02-19
Payer: COMMERCIAL

## 2025-02-19 ENCOUNTER — LAB ENCOUNTER (OUTPATIENT)
Dept: LAB | Age: 60
End: 2025-02-19
Attending: NURSE PRACTITIONER
Payer: COMMERCIAL

## 2025-02-19 VITALS
OXYGEN SATURATION: 97 % | HEART RATE: 85 BPM | BODY MASS INDEX: 30 KG/M2 | SYSTOLIC BLOOD PRESSURE: 124 MMHG | RESPIRATION RATE: 17 BRPM | DIASTOLIC BLOOD PRESSURE: 70 MMHG | WEIGHT: 163.19 LBS

## 2025-02-19 DIAGNOSIS — E78.2 MIXED HYPERLIPIDEMIA: ICD-10-CM

## 2025-02-19 DIAGNOSIS — E11.65 TYPE 2 DIABETES MELLITUS WITH HYPERGLYCEMIA, WITH LONG-TERM CURRENT USE OF INSULIN (HCC): Primary | ICD-10-CM

## 2025-02-19 DIAGNOSIS — Z79.4 TYPE 2 DIABETES MELLITUS WITH HYPERGLYCEMIA, WITH LONG-TERM CURRENT USE OF INSULIN (HCC): Primary | ICD-10-CM

## 2025-02-19 DIAGNOSIS — Z79.4 TYPE 2 DIABETES MELLITUS WITH HYPERGLYCEMIA, WITH LONG-TERM CURRENT USE OF INSULIN (HCC): ICD-10-CM

## 2025-02-19 DIAGNOSIS — I10 PRIMARY HYPERTENSION: ICD-10-CM

## 2025-02-19 DIAGNOSIS — E78.5 HYPERLIPIDEMIA, UNSPECIFIED HYPERLIPIDEMIA TYPE: ICD-10-CM

## 2025-02-19 DIAGNOSIS — E11.65 TYPE 2 DIABETES MELLITUS WITH HYPERGLYCEMIA, WITH LONG-TERM CURRENT USE OF INSULIN (HCC): ICD-10-CM

## 2025-02-19 DIAGNOSIS — E66.9 OBESITY (BMI 30-39.9): ICD-10-CM

## 2025-02-19 LAB
ALBUMIN SERPL-MCNC: 5 G/DL (ref 3.2–4.8)
ALBUMIN/GLOB SERPL: 1.5 {RATIO} (ref 1–2)
ALP LIVER SERPL-CCNC: 90 U/L
ALT SERPL-CCNC: 15 U/L
ANION GAP SERPL CALC-SCNC: 9 MMOL/L (ref 0–18)
AST SERPL-CCNC: 17 U/L (ref ?–34)
BILIRUB SERPL-MCNC: 0.4 MG/DL (ref 0.3–1.2)
BUN BLD-MCNC: 14 MG/DL (ref 9–23)
CALCIUM BLD-MCNC: 9.6 MG/DL (ref 8.7–10.6)
CHLORIDE SERPL-SCNC: 101 MMOL/L (ref 98–112)
CHOLEST SERPL-MCNC: 208 MG/DL (ref ?–200)
CO2 SERPL-SCNC: 26 MMOL/L (ref 21–32)
CREAT BLD-MCNC: 0.69 MG/DL
CREAT UR-SCNC: 106.5 MG/DL
EGFRCR SERPLBLD CKD-EPI 2021: 101 ML/MIN/1.73M2 (ref 60–?)
FASTING PATIENT LIPID ANSWER: YES
FASTING STATUS PATIENT QL REPORTED: YES
GLOBULIN PLAS-MCNC: 3.4 G/DL (ref 2–3.5)
GLUCOSE BLD-MCNC: 124 MG/DL (ref 70–99)
GLUCOSE BLOOD: 130
HDLC SERPL-MCNC: 46 MG/DL (ref 40–59)
HEMOGLOBIN A1C: 7.6 % (ref 4.3–5.6)
LDLC SERPL CALC-MCNC: 98 MG/DL (ref ?–100)
MICROALBUMIN UR-MCNC: 0.7 MG/DL
MICROALBUMIN/CREAT 24H UR-RTO: 6.6 UG/MG (ref ?–30)
NONHDLC SERPL-MCNC: 162 MG/DL (ref ?–130)
OSMOLALITY SERPL CALC.SUM OF ELEC: 284 MOSM/KG (ref 275–295)
POTASSIUM SERPL-SCNC: 4.6 MMOL/L (ref 3.5–5.1)
PROT SERPL-MCNC: 8.4 G/DL (ref 5.7–8.2)
SODIUM SERPL-SCNC: 136 MMOL/L (ref 136–145)
TEST STRIP LOT #: NORMAL NUMERIC
TRIGL SERPL-MCNC: 382 MG/DL (ref 30–149)
TSI SER-ACNC: 1.92 UIU/ML (ref 0.55–4.78)
VLDLC SERPL CALC-MCNC: 64 MG/DL (ref 0–30)

## 2025-02-19 PROCEDURE — 84443 ASSAY THYROID STIM HORMONE: CPT | Performed by: NURSE PRACTITIONER

## 2025-02-19 PROCEDURE — 82947 ASSAY GLUCOSE BLOOD QUANT: CPT | Performed by: NURSE PRACTITIONER

## 2025-02-19 PROCEDURE — 82570 ASSAY OF URINE CREATININE: CPT | Performed by: NURSE PRACTITIONER

## 2025-02-19 PROCEDURE — 83036 HEMOGLOBIN GLYCOSYLATED A1C: CPT | Performed by: NURSE PRACTITIONER

## 2025-02-19 PROCEDURE — 82043 UR ALBUMIN QUANTITATIVE: CPT | Performed by: NURSE PRACTITIONER

## 2025-02-19 PROCEDURE — 81003 URINALYSIS AUTO W/O SCOPE: CPT | Performed by: NURSE PRACTITIONER

## 2025-02-19 PROCEDURE — 80061 LIPID PANEL: CPT | Performed by: INTERNAL MEDICINE

## 2025-02-19 PROCEDURE — 80053 COMPREHEN METABOLIC PANEL: CPT | Performed by: INTERNAL MEDICINE

## 2025-02-19 PROCEDURE — G2211 COMPLEX E/M VISIT ADD ON: HCPCS | Performed by: NURSE PRACTITIONER

## 2025-02-19 PROCEDURE — 3078F DIAST BP <80 MM HG: CPT | Performed by: NURSE PRACTITIONER

## 2025-02-19 PROCEDURE — 3074F SYST BP LT 130 MM HG: CPT | Performed by: NURSE PRACTITIONER

## 2025-02-19 PROCEDURE — 99214 OFFICE O/P EST MOD 30 MIN: CPT | Performed by: NURSE PRACTITIONER

## 2025-02-19 RX ORDER — DAPAGLIFLOZIN 10 MG/1
10 TABLET, FILM COATED ORAL DAILY
Qty: 90 TABLET | Refills: 1 | Status: SHIPPED | OUTPATIENT
Start: 2025-02-19

## 2025-02-19 NOTE — PATIENT INSTRUCTIONS
A1C 7.6%   desde el último control  tu peso también ha aumentado      He enviado la receta de farxiga a mahmood farmacia.   llámenos si no pueden darle el medicamento     Termine Ozempic 1 mg subcutáneo estella vez a la semana.     estella vez finalizado, comience con Ozempic 2,0 mg estella vez a la semana    continuar     Metformina 1000 mg dos veces al día     Farxiga 10 mg estella vez al día          Continuaremos trabajando juntos para mantener jordan niveles de azúcar en benjie en un objetivo saludable.      El objetivo principal del tratamiento de la diabetes es evitar que mahmood nivel de azúcar suba demasiado. Medimos jordan tendencias generales de azúcar en benjie con estella prueba de hemoglobina A1C. (también llamado A1C) Para la mayoría de las personas, el objetivo es menos del 7.0%, sony a veces hacemos excepciones basadas en la edad, el historial de dafne y otros factores.  Mantener un A1C por debajo del 7% ayuda a prevenir problemas de dafne relacionados con la diabetes.  Si mahmood A1C aumenta demasiado, entonces debemos hablar sobre cambiar mahmood tratamiento actual para la diabetes.        Es importante grant todos jordan medicamentos según lo prescrito.  Además, llámeme si tiene algún problema con preguntas sobre medicamentos, efectos secundarios, preguntas sobre la dosificación o problemas con las tendencias de azúcar en benjie ANTES DE CAMBIAR O DETENER CUALQUIER MEDICAMENTO.    Prueba de azúcar en benjie:  Recuerde llevar mahmood medidor de glucosa o registro de azúcar en benjie a cada alicia en el centro de diabetes.  [place] me permite realizar ajustes de forma keith en mahmood plan de diabetes.  Para poder determinar cualquier patrón en mahmood nivel de azúcar en benjie, necesitará medir mahmood nivel de azúcar en benjie 1- 2  veces al día  Momentos recomendados para realizar la prueba: antes del desayuno (en ayunas) y luego alternar la prueba de azúcar en benjie 2 horas después de las comidas.    Objetivos de azúcar en benjie:  Antes del desayuno:   (preferiblemente menos de 110)  2 horas Después de las comidas: menos de 180 (preferiblemente menos de 150)  Solicite niveles de azúcar en benjie persistentes inferiores a 75 o superiores a 200.  Los niveles de azúcar en benjie superiores a 200 no son aceptables para alcanzar mahmood objetivo de mejorar la diabetes  Esté atento a los niveles bajos de azúcar en benjie: (menos de 70)      Síntomas de niveles bajos de azúcar en benjie:  Temblores o mareos  Piel fría y húmeda o sudorosa  Tener hambre  Dolor de rafa  Nerviosismo  Un latido maria dolores y rápido  Debilidad  Confusión o irritabilidad  [place] borrosa  Tener pesadillas o despertarse confundido o sudando  Entumecimiento u hormigueo en los labios o la lengua    Plan de acción para el tratamiento de la hipoglucemia  1. Revise la glucosa en benjie para asegurarse de que esté baja. No siempre puedes seguir los síntomas. En anuj de nadya, trate mahmood nivel bajo de glucosa en benjie de todos modos.  2. [place] 15 gramos de carbohidratos (carbohidratos). Aquí hay algunas opciones:  4 onzas. jugo de fruta regular  3-4 tabletas de glucosa  6 onzas. refresco regular  7-8 gominolas  3. Vuelva a controlar la glucosa en benjie después de 10 a 15 minutos. Si la glucosa en benjie sigue siendo baja (menos de 70 mg / dl) repita el tratamiento (paso 2).  4. Si falta más de estella hora para mahmood próxima comida, coma un bocadillo pequeño.  5. Si no está seguro de la causa de mahmood nivel bajo de glucosa en benjie, llame a mahmood proveedor de atención médica.  6. Siempre controle mahmood glucosa en benjie antes de conducir          Nadeem  Heidy Grover  [phone]

## 2025-02-20 LAB
BILIRUB UR QL STRIP.AUTO: NEGATIVE
CLARITY UR REFRACT.AUTO: CLEAR
GLUCOSE UR STRIP.AUTO-MCNC: >1000 MG/DL
KETONES UR STRIP.AUTO-MCNC: NEGATIVE MG/DL
LEUKOCYTE ESTERASE UR QL STRIP.AUTO: NEGATIVE
NITRITE UR QL STRIP.AUTO: NEGATIVE
PH UR STRIP.AUTO: 5.5 [PH] (ref 5–8)
PROT UR STRIP.AUTO-MCNC: NEGATIVE MG/DL
RBC UR QL AUTO: NEGATIVE
SP GR UR STRIP.AUTO: >1.03 (ref 1–1.03)
UROBILINOGEN UR STRIP.AUTO-MCNC: NORMAL MG/DL

## 2025-02-25 ENCOUNTER — TELEPHONE (OUTPATIENT)
Dept: INTERNAL MEDICINE CLINIC | Facility: CLINIC | Age: 60
End: 2025-02-25

## 2025-02-25 NOTE — TELEPHONE ENCOUNTER
Patient's daughter returned call from the nurse regarding lab results. Please call back regarding results and next steps.

## 2025-02-25 NOTE — TELEPHONE ENCOUNTER
Spoke to patient's daughter, annette spangler per patient with test results per PCP recommendation. Annette verbalized understanding.

## 2025-04-03 ENCOUNTER — OFFICE VISIT (OUTPATIENT)
Dept: INTERNAL MEDICINE CLINIC | Facility: CLINIC | Age: 60
End: 2025-04-03
Payer: COMMERCIAL

## 2025-04-03 VITALS
HEIGHT: 60 IN | TEMPERATURE: 97 F | HEART RATE: 64 BPM | SYSTOLIC BLOOD PRESSURE: 128 MMHG | WEIGHT: 165 LBS | DIASTOLIC BLOOD PRESSURE: 66 MMHG | BODY MASS INDEX: 32.39 KG/M2 | OXYGEN SATURATION: 98 %

## 2025-04-03 DIAGNOSIS — E78.5 HYPERLIPIDEMIA, UNSPECIFIED HYPERLIPIDEMIA TYPE: Primary | ICD-10-CM

## 2025-04-03 DIAGNOSIS — E11.65 TYPE 2 DIABETES MELLITUS WITH HYPERGLYCEMIA, WITH LONG-TERM CURRENT USE OF INSULIN (HCC): ICD-10-CM

## 2025-04-03 DIAGNOSIS — I10 HYPERTENSION, UNSPECIFIED TYPE: ICD-10-CM

## 2025-04-03 DIAGNOSIS — Z12.31 VISIT FOR SCREENING MAMMOGRAM: ICD-10-CM

## 2025-04-03 DIAGNOSIS — R51.9 NONINTRACTABLE HEADACHE, UNSPECIFIED CHRONICITY PATTERN, UNSPECIFIED HEADACHE TYPE: ICD-10-CM

## 2025-04-03 DIAGNOSIS — Z79.4 TYPE 2 DIABETES MELLITUS WITH HYPERGLYCEMIA, WITH LONG-TERM CURRENT USE OF INSULIN (HCC): ICD-10-CM

## 2025-04-03 DIAGNOSIS — Z12.11 SCREENING FOR COLON CANCER: ICD-10-CM

## 2025-04-03 DIAGNOSIS — M54.32 SCIATICA OF LEFT SIDE: ICD-10-CM

## 2025-04-03 PROCEDURE — 3051F HG A1C>EQUAL 7.0%<8.0%: CPT | Performed by: INTERNAL MEDICINE

## 2025-04-03 PROCEDURE — 3078F DIAST BP <80 MM HG: CPT | Performed by: INTERNAL MEDICINE

## 2025-04-03 PROCEDURE — 3008F BODY MASS INDEX DOCD: CPT | Performed by: INTERNAL MEDICINE

## 2025-04-03 PROCEDURE — 3061F NEG MICROALBUMINURIA REV: CPT | Performed by: INTERNAL MEDICINE

## 2025-04-03 PROCEDURE — 99214 OFFICE O/P EST MOD 30 MIN: CPT | Performed by: INTERNAL MEDICINE

## 2025-04-03 PROCEDURE — 3074F SYST BP LT 130 MM HG: CPT | Performed by: INTERNAL MEDICINE

## 2025-04-03 PROCEDURE — G2211 COMPLEX E/M VISIT ADD ON: HCPCS | Performed by: INTERNAL MEDICINE

## 2025-04-03 RX ORDER — SUMATRIPTAN 50 MG/1
50 TABLET, FILM COATED ORAL AS DIRECTED
Qty: 30 TABLET | Refills: 0 | Status: SHIPPED | OUTPATIENT
Start: 2025-04-03

## 2025-04-03 NOTE — PROGRESS NOTES
Subjective:   Marcy Moon is a 59 year old female  who presents for Follow - Up (Diabetes )         The following individual(s) verbally consented to be recorded using ambient AI listening technology and understand that they can each withdraw their consent to this listening technology at any point by asking the clinician to turn off or pause the recording:    Patient name: Marcy Moon        History of Present Illness  The patient, with a history of diabetes and high cholesterol, presents for follow-up. She has been managing her diabetes with Heidy Grover and has shown improvement. However, the patient has been experiencing severe headaches twice a month, lasting for several days. The headaches are described as intense and are not relieved by Tylenol. The patient denies nausea, vomiting, or changes in vision associated with the headaches. She also reports a burning pain that radiates from the hip to foot, suggestive of sciatica. The pain is intermittent and has occurred twice recently. She also reports oral discomfort due to recent dental work.    History/Other:    Chief Complaint Reviewed and Verified  Nursing Notes Reviewed and   Verified  Tobacco Reviewed  Allergies Reviewed  Medications Reviewed    Problem List Reviewed  Medical History Reviewed  Surgical History   Reviewed  OB Status Reviewed  Family History Reviewed         Current Outpatient Medications   Medication Sig Dispense Refill    ezetimibe (ZETIA) 10 MG Oral Tab Take 1 tablet (10 mg total) by mouth daily. 90 tablet 0    FARXIGA 10 MG Oral Tab Take 1 tablet (10 mg total) by mouth daily. 90 tablet 1    semaglutide 8 MG/3ML Subcutaneous Solution Pen-injector Inject 2 mg into the skin once a week. 9 mL 1    metFORMIN HCl 1000 MG Oral Tab Take 1 tablet (1,000 mg total) by mouth 2 (two) times daily with meals. 180 tablet 1    gabapentin 100 MG Oral Cap Take 1 capsule (100 mg total) by mouth 3 (three) times daily. 90 capsule 1    atorvastatin  80 MG Oral Tab Take 1 tablet (80 mg total) by mouth nightly. 90 tablet 3    Insulin Lispro, 1 Unit Dial, (HUMALOG KWIKPEN) 100 UNIT/ML Subcutaneous Solution Pen-injector Up to 15 units daily based on BG reading at meals 15 mL 0    amLODIPine 5 MG Oral Tab Take 1 tablet (5 mg total) by mouth daily. 90 tablet 3    lisinopril 40 MG Oral Tab Take 1 tablet (40 mg total) by mouth daily. 1x daily (Patient not taking: Reported on 4/3/2025) 90 tablet 3       Review of Systems:  Pertinent items are noted in HPI.  A comprehensive 10 point review of systems was completed.  Pertinent positives and negatives noted in the the HPI.        Objective:   /66 (BP Location: Left arm, Patient Position: Sitting, Cuff Size: adult)   Pulse 64   Temp 97.4 °F (36.3 °C) (Temporal)   Ht 5' (1.524 m)   Wt 165 lb (74.8 kg)   LMP  (LMP Unknown)   SpO2 98%   BMI 32.22 kg/m²  Estimated body mass index is 32.22 kg/m² as calculated from the following:    Height as of this encounter: 5' (1.524 m).    Weight as of this encounter: 165 lb (74.8 kg).    Physical Exam  GENERAL: Alert, cooperative, no acute distress  CHEST: Clear to auscultation bilaterally, no wheezes, rhonchi, or crackles  CARDIOVASCULAR: Normal heart rate and rhythm, S1 and S2 normal without murmurs  EXTREMITIES: No cyanosis or edema  NEUROLOGICAL: Cranial nerves grossly intact, pupils equal, round, and reactive to light, tongue midline with no deviation, neck strength normal, moves all extremities without gross motor or sensory deficit, sensation symmetric and intact      Assessment & Plan:     Assessment & Plan  Headache  Recurrent severe headaches twice a month, disrupting sleep. Differential includes migraine.  - Prescribe sumatriptan at onset, repeatable after two hours, max 200 mg/24 hours.  - Order CT scan to rule out intracranial pathology.  - Refer to neurology for further evaluation.  Discussed when to seek urgent medical attention; voiced understanding      Sciatica  Intermittent burning and aching pain from back to foot, suggestive of sciatica.  - Refer to physical therapy for management.  - Consider home exercises or online therapy if transportation is difficult.  - Order X-ray     Type 2 Diabetes Mellitus  Diabetes management ongoing with recent control near target.  - Continue follow-up with Heidy Grover.      Hyperlipidemia  Cardiovascular Risk Assessment  Requires assessment of current lipid levels and liver function.  -statin   - Order fasting blood tests to check cholesterol and liver function.  -CT calcium score       Preventive Health Screening  Due for mammogram and colon cancer screening.  - mammogram.  - Provide fecal occult blood test for colon cancer screening. If positive, recommend colonoscopy.    Vision Care  Requires follow-up with an ophthalmologist.  - Provide referral to an ophthalmologist.        This note was created utilizing [employer] speech recognition software which may lead to grammatical errors/typos.   If any word or phrase is confusing, it is likely due to recognition error. Please ask the provider for clarification.      Abi Jose MD

## 2025-04-22 DIAGNOSIS — I10 HYPERTENSION, UNSPECIFIED TYPE: ICD-10-CM

## 2025-04-22 RX ORDER — LISINOPRIL 40 MG/1
40 TABLET ORAL DAILY
Qty: 90 TABLET | Refills: 3 | Status: SHIPPED | OUTPATIENT
Start: 2025-04-22

## 2025-04-22 NOTE — TELEPHONE ENCOUNTER
Requesting    Name from pharmacy: LISINOPRIL 40 MG TABLET         Will file in chart as: LISINOPRIL 40 MG Oral Tab    Sig: TAKE 1 TABLET (40 MG TOTAL) BY MOUTH ONCE DAILY.    Disp: 90 tablet    Refills: 3    Start: 4/22/2025    Class: Normal    Non-formulary For: Hypertension, unspecified type    Last ordered: 1 year ago (2/6/2024) by Abi Jose MD    Last refill: 1/25/2025    Rx #: [id number]    Hypertension Medications Protocol [id number]/22/2025 12:27 AM   Protocol Details CMP or BMP in past 12 months    Last BP reading less than 140/90    In person appointment or virtual visit in the past 12 mos or appointment in next 3 mos    EGFRCR or GFRNAA > 50    Medication is active on med list      To be filled at: [facility] [id number] [place] [address] [phone], [phone]     LOV: 04/03/25 w/ JOSI  RTC: 07/03/25  Last Relevant Labs: 02/19/25  Future Appointments   Date Time Provider Department Center   7/23/2025  7:30 AM Heidy Grover APRN [id number] [place] [facility]

## 2025-07-23 ENCOUNTER — TELEPHONE (OUTPATIENT)
Dept: ENDOCRINOLOGY CLINIC | Facility: CLINIC | Age: 60
End: 2025-07-23

## 2025-07-24 DIAGNOSIS — E78.5 HYPERLIPIDEMIA, UNSPECIFIED HYPERLIPIDEMIA TYPE: ICD-10-CM

## 2025-07-24 RX ORDER — ATORVASTATIN CALCIUM 80 MG/1
80 TABLET, FILM COATED ORAL NIGHTLY
Qty: 90 TABLET | Refills: 3 | Status: SHIPPED | OUTPATIENT
Start: 2025-07-24

## 2025-07-24 NOTE — TELEPHONE ENCOUNTER
Requested Prescriptions     Pending Prescriptions Disp Refills    METFORMIN HCL 1000 MG Oral Tab [Pharmacy Med Name: METFORMIN HCL 1,000 MG TABLET] 180 tablet 1     Sig: TAKE 1 TABLET BY MOUTH TWICE A DAY WITH MEALS     No future appointments.  Last A1c value was 7.6% done 2/19/2025.  Refill 10/30/24 Nadine   LOV 02/19/25 Nadine

## 2025-07-24 NOTE — TELEPHONE ENCOUNTER
Requesting: Atorvastatin 80 mg   Protocol: Passed  Last Office Visit: 4/3/25  Labs: Completed in   Last Refill: 6/4/24 #90 3 Refills   Return to Clinic: No future appointments.

## 2025-07-29 DIAGNOSIS — I10 HYPERTENSION, UNSPECIFIED TYPE: ICD-10-CM

## 2025-07-29 RX ORDER — AMLODIPINE BESYLATE 5 MG/1
5 TABLET ORAL DAILY
Qty: 90 TABLET | Refills: 3 | Status: SHIPPED | OUTPATIENT
Start: 2025-07-29

## 2025-08-22 RX ORDER — SEMAGLUTIDE 2.68 MG/ML
2 INJECTION, SOLUTION SUBCUTANEOUS WEEKLY
Qty: 9 ML | Refills: 1 | Status: SHIPPED | OUTPATIENT
Start: 2025-08-22

## (undated) NOTE — Clinical Note
Appt today; 8.1% A1C  Using humalog as needed. Doing better on ozempic ,farxiga. Encouraged her to resume dexcom to see impact food has on her BG trends Hoping next A1c is under 7%   Heidy

## (undated) NOTE — LETTER
7/23/2025    Marcy Moon  [address] [place] [address] [place] [address]    Dear Marcy,    Due to a missed appointment on 07/23/25, your account has been charged $50.    No-show policy    A $50 fee will be charged for missed appointments.    To accommodate all our patients, we require at least 24 hours' notice if you need to cancel or reschedule your appointment.    If three appointments are missed within a 12-month period, we may begin the dismissal process for future care at [facility].    We value the relationship we have established with you.  We hope to continue to serve your health care needs.      Sincerely,    [facility]